# Patient Record
Sex: FEMALE | Race: WHITE | NOT HISPANIC OR LATINO | Employment: FULL TIME | URBAN - METROPOLITAN AREA
[De-identification: names, ages, dates, MRNs, and addresses within clinical notes are randomized per-mention and may not be internally consistent; named-entity substitution may affect disease eponyms.]

---

## 2017-06-13 ENCOUNTER — OFFICE VISIT (OUTPATIENT)
Dept: URGENT CARE | Facility: URGENT CARE | Age: 52
End: 2017-06-13
Payer: COMMERCIAL

## 2017-06-13 VITALS
SYSTOLIC BLOOD PRESSURE: 131 MMHG | DIASTOLIC BLOOD PRESSURE: 84 MMHG | WEIGHT: 162 LBS | TEMPERATURE: 98.4 F | HEART RATE: 78 BPM

## 2017-06-13 DIAGNOSIS — S80.812A ABRASION OF ANTERIOR LOWER LEG, LEFT, INITIAL ENCOUNTER: Primary | ICD-10-CM

## 2017-06-13 DIAGNOSIS — Z00.00 PREVENTATIVE HEALTH CARE: ICD-10-CM

## 2017-06-13 PROCEDURE — 90715 TDAP VACCINE 7 YRS/> IM: CPT | Performed by: INTERNAL MEDICINE

## 2017-06-13 PROCEDURE — 90471 IMMUNIZATION ADMIN: CPT | Performed by: INTERNAL MEDICINE

## 2017-06-13 PROCEDURE — 99203 OFFICE O/P NEW LOW 30 MIN: CPT | Mod: 25 | Performed by: INTERNAL MEDICINE

## 2017-06-13 RX ORDER — FLUTICASONE PROPIONATE AND SALMETEROL XINAFOATE 45; 21 UG/1; UG/1
AEROSOL, METERED RESPIRATORY (INHALATION)
COMMUNITY
Start: 2017-05-26

## 2017-06-13 NOTE — MR AVS SNAPSHOT
"              After Visit Summary   2017    Sarai Phelps    MRN: [id number]           Patient Information     Date Of Birth          1965        Visit Information        Provider Department      2017 7:20 PM Divina Bryant MD [facility]        Today's Diagnoses     Abrasion of anterior lower leg, left, initial encounter    -  1    Preventative health care           Follow-ups after your visit        Follow-up notes from your care team     Return if symptoms worsen or fail to improve.      Who to contact     If you have questions or need follow up information about today's clinic visit or your schedule please contact [facility] directly at [phone].  Normal or non-critical lab and imaging results will be communicated to you by MyChart, letter or phone within 4 business days after the clinic has received the results. If you do not hear from us within 7 days, please contact the clinic through MyChart or phone. If you have a critical or abnormal lab result, we will notify you by phone as soon as possible.  Submit refill requests through [employer] or call your pharmacy and they will forward the refill request to us. Please allow 3 business days for your refill to be completed.          Additional Information About Your Visit        MyChart Information     [employer] lets you send messages to your doctor, view your test results, renew your prescriptions, schedule appointments and more. To sign up, go to [email or web address] . Click on \"Log in\" on the left side of the screen, which will take you to the Welcome page. Then click on \"Sign up Now\" on the right side of the page.     You will be asked to enter the access code listed below, as well as some personal information. Please follow the directions to create your username and password.     Your access code is: [email or web address]  Expires: 2017  9:00 PM     Your access code will  in 90 days. If you need help or a " new code, please call your [place] clinic or [phone].        Care EveryWhere ID     This is your Care EveryWhere ID. This could be used by other organizations to access your [place] medical records  [id number]        Your Vitals Were     Pulse Temperature                78 98.4  F (36.9  C) (Oral)           Blood Pressure from Last 3 Encounters:   06/13/17 131/84    Weight from Last 3 Encounters:   06/13/17 162 lb (73.5 kg)              We Performed the Following     ADMIN 1st VACCINE     SCREENING QUESTIONS FOR ADULT IMMUNIZATIONS     TDAP VACCINE (ADACEL)        Primary Care Provider    None Specified       No primary provider on file.        Thank you!     [facility] [place]  [facility]. Our goal is always to provide you with excellent care. Hearing back from our patients is one way we can continue to improve our services. Please take a few minutes to complete the written survey that you may receive in the mail after your visit with us. Thank you!             Your Updated Medication List - Protect others around you: Learn how to safely use, store and throw away your medicines at www.disposemymeds.org.          This list is accurate as of: 6/13/17  9:00 PM.  Always use your most recent med list.                   Brand Name Dispense Instructions for use    ADVAIR HFA 45-21 MCG/ACT inhaler   Generic drug:  fluticasone-salmeterol

## 2017-06-14 NOTE — NURSING NOTE
Screening Questionnaire for Adult Immunization    Are you sick today?   No   Do you have allergies to medications, food, a vaccine component or latex?   No   Have you ever had a serious reaction after receiving a vaccination?   No   Do you have a long-term health problem with heart disease, lung disease, asthma, kidney disease, metabolic disease (e.g. diabetes), anemia, or other blood disorder?   No   Do you have cancer, leukemia, HIV/AIDS, or any other immune system problem?   No   In the past 3 months, have you taken medications that affect  your immune system, such as prednisone, other steroids, or anticancer drugs; drugs for the treatment of rheumatoid arthritis, Crohn s disease, or psoriasis; or have you had radiation treatments?   No   Have you had a seizure, or a brain or other nervous system problem?   No   During the past year, have you received a transfusion of blood or blood     products, or been given immune (gamma) globulin or antiviral drug?   No   For women: Are you pregnant or is there a chance you could become        pregnant during the next month?   No   Have you received any vaccinations in the past 4 weeks?   No     Immunization questionnaire answers were all negative.      MNVFC doesn't apply on this patient    Per orders of Dr. Bryatn, injection of TDAP given by Heidy Alford. Patient instructed to remain in clinic for 20 minutes afterwards, and to report any adverse reaction to me immediately.       Screening performed by Heidy Alford on 6/13/2017 at 7:54 PM.

## 2017-06-14 NOTE — NURSING NOTE
Chief Complaint   Patient presents with     Fall     X 4 days ago, scraped left leg, now has swelling and bruising       Initial /84  Pulse 78  Temp 98.4  F (36.9  C) (Oral)  Wt 162 lb (73.5 kg) There is no height or weight on file to calculate BMI.  Medication Reconciliation: Complete   Heidy Alford CMA

## 2017-06-14 NOTE — PROGRESS NOTES
SUBJECTIVE:  Sarai Phelps is an 51 year old female who presents for scratch on leg.  Has covered it with a bandage and put abx ointment on it.  Fell on a rock four days ago and scraped her leg.  Today seems red around it, not sure how much more than it did before.  Noticed a little swelling of leg also.  No fevers, chills or sweats.  Hurts to touch around the area of redness.  Doesn't know when last tetanus shot was.  No recent travel.  No n/v.         has no past medical history on file.  ALLERGIES:  Review of patient's allergies indicates no known allergies.    Current Outpatient Prescriptions   Medication     ADVAIR HFA 45-21 MCG/ACT inhaler     No current facility-administered medications for this visit.          ROS:  ROS is done and is negative for general/constitutional, eye, ENT, Respiratory, cardiovascular, GI, , Skin, musculoskeletal except as noted elsewhere.       OBJECTIVE:  /84  Pulse 78  Temp 98.4  F (36.9  C) (Oral)  Wt 162 lb (73.5 kg)  GENERAL APPEARANCE: Alert, in no acute distress  EYES: normal  NOSE: normal  OROPHARYNX:normal  NECK:No adenopathy,masses or thyromegaly  RESP: normal and clear to auscultation  CV:regular rate and rhythm and no murmurs, clicks, or gallops  ABDOMEN: Abdomen soft, non-tender. BS normal. No masses, organomegaly  SKIN: Left anterior shin with abrasion approx 4x8 inches at largest area, but area affected is irregular.  There are two deeper scratches in the center of the affected area approx 2.5inches long one superior to the other.  The area around the deeper scratches has more shallow abrasion that appears to be healing well.  No increased warmth.  No erythema outside of the abrasion area.  Tender over the deeper scratches, but the more shallow abrasion areas are only mildly tender.  No drainage or fluctuance noted.  MUSCULOSKELETAL:Musculoskeletal normal      RECENT LAB RESULTS  No results found for this or any previous  visit..    ASSESSMENT/PLAN:    ASSESSMENT / PLAN:  (I24.236X) Abrasion of anterior lower leg, left, initial encounter  (primary encounter diagnosis)  Comment: feel on a rock a few days ago. Currently no evidence of infection and the areas of erythema are due to the abrasion and healing, and it appears to be healing as expected.  Plan: I reviewed supportive care including abx ointment, expected course, and signs of concern including signs of infection.  Follow up prn or if she does not improve or if worsens in any way.  Pt does not know when last tetanus booster was, so will give today    (Z00.00) Preventative health care  Comment:   Plan: TDAP VACCINE (ADACEL), ADMIN 1st VACCINE,         SCREENING QUESTIONS FOR ADULT IMMUNIZATIONS              See [facility] for orders, medications, letters, patient instructions    Divina Bryant M.D.

## 2018-01-18 ENCOUNTER — OFFICE VISIT (OUTPATIENT)
Dept: FAMILY MEDICINE | Facility: CLINIC | Age: 53
End: 2018-01-18
Payer: COMMERCIAL

## 2018-01-18 VITALS
DIASTOLIC BLOOD PRESSURE: 87 MMHG | HEART RATE: 83 BPM | TEMPERATURE: 97.3 F | WEIGHT: 161 LBS | BODY MASS INDEX: 26.82 KG/M2 | HEIGHT: 65 IN | SYSTOLIC BLOOD PRESSURE: 135 MMHG

## 2018-01-18 DIAGNOSIS — R30.0 DYSURIA: Primary | ICD-10-CM

## 2018-01-18 LAB
ALBUMIN UR-MCNC: NEGATIVE MG/DL
APPEARANCE UR: CLEAR
BILIRUB UR QL STRIP: NEGATIVE
COLOR UR AUTO: YELLOW
GLUCOSE UR STRIP-MCNC: NEGATIVE MG/DL
HGB UR QL STRIP: NEGATIVE
KETONES UR STRIP-MCNC: NEGATIVE MG/DL
LEUKOCYTE ESTERASE UR QL STRIP: NEGATIVE
NITRATE UR QL: NEGATIVE
PH UR STRIP: 6.5 PH (ref 5–7)
SOURCE: NORMAL
SP GR UR STRIP: 1.01 (ref 1–1.03)
UROBILINOGEN UR STRIP-ACNC: 0.2 EU/DL (ref 0.2–1)

## 2018-01-18 PROCEDURE — 81003 URINALYSIS AUTO W/O SCOPE: CPT | Performed by: FAMILY MEDICINE

## 2018-01-18 PROCEDURE — 99213 OFFICE O/P EST LOW 20 MIN: CPT | Performed by: FAMILY MEDICINE

## 2018-01-18 RX ORDER — FLUCONAZOLE 150 MG/1
150 TABLET ORAL ONCE
Qty: 1 TABLET | Refills: 0 | Status: SHIPPED | OUTPATIENT
Start: 2018-01-18 | End: 2018-01-18

## 2018-01-18 NOTE — MR AVS SNAPSHOT
"              After Visit Summary   2018    Sarai Phelps    MRN: [id number]           Patient Information     Date Of Birth          1965        Visit Information        Provider Department      2018 3:40 PM Radha Fernandez MD [facility]        Today's Diagnoses     Dysuria    -  1       Follow-ups after your visit        Who to contact     If you have questions or need follow up information about today's clinic visit or your schedule please contact [facility] directly at [phone].  Normal or non-critical lab and imaging results will be communicated to you by [employer], letter or phone within 4 business days after the clinic has received the results. If you do not hear from us within 7 days, please contact the clinic through [employer] or phone. If you have a critical or abnormal lab result, we will notify you by phone as soon as possible.  Submit refill requests through [employer] or call your pharmacy and they will forward the refill request to us. Please allow 3 business days for your refill to be completed.          Additional Information About Your Visit        MyChart Information     [employer] lets you send messages to your doctor, view your test results, renew your prescriptions, schedule appointments and more. To sign up, go to [email or web address] . Click on \"Log in\" on the left side of the screen, which will take you to the Welcome page. Then click on \"Sign up Now\" on the right side of the page.     You will be asked to enter the access code listed below, as well as some personal information. Please follow the directions to create your username and password.     Your access code is: [email or web address]  Expires: 2018  4:48 PM     Your access code will  in 90 days. If you need help or a new code, please call your [facility] or [phone].        Care EveryWhere ID     This is your Care EveryWhere ID. This could be used by other organizations to access " "your [place] medical records  [id number]        Your Vitals Were     Pulse Temperature Height BMI (Body Mass Index)          83 97.3  F (36.3  C) (Oral) 5' 5\" (1.651 m) 26.79 kg/m2         Blood Pressure from Last 3 Encounters:   01/18/18 135/87   06/13/17 131/84    Weight from Last 3 Encounters:   01/18/18 161 lb (73 kg)   06/13/17 162 lb (73.5 kg)              We Performed the Following     *UA reflex to Microscopic and Culture ([place] and [facility] (except [place] and [place])          Today's Medication Changes          These changes are accurate as of: 1/18/18 11:59 PM.  If you have any questions, ask your nurse or doctor.               Start taking these medicines.        Dose/Directions    fluconazole 150 MG tablet   Commonly known as:  DIFLUCAN   Used for:  Dysuria   Started by:  Radha Fernandez MD        Dose:  150 mg   Take 1 tablet (150 mg) by mouth once for 1 dose   Quantity:  1 tablet   Refills:  0            Where to get your medicines      These medications were sent to [place] [address] [place] [facility] - [address] [address]     Phone:  [phone]     fluconazole 150 MG tablet                Primary Care Provider Office Phone # Fax #    [facility] [phone] [address] [facility] [address]        Equal Access to Services     DANIEL YOUSIF AH: Hadii paresh ku hadasho Soomaali, waaxda luqadaha, qaybta kaalmada adeegyada, sherry payne . So [facility] [phone].    ATENCIÓN: Si habla español, tiene a trujillo disposición servicios gratuitos de asistencia lingüística. Llame al [phone].    We comply with applicable federal civil rights laws and [place] laws. We do not discriminate on the basis of race, color, national origin, age, disability, sex, sexual orientation, or gender identity.            Thank you!     Thank you for choosing [facility]  for your care. Our goal is " always to provide you with excellent care. Hearing back from our patients is one way we can continue to improve our services. Please take a few minutes to complete the written survey that you may receive in the mail after your visit with us. Thank you!             Your Updated Medication List - Protect others around you: Learn how to safely use, store and throw away your medicines at www.disposemymeds.org.          This list is accurate as of: 1/18/18 11:59 PM.  Always use your most recent med list.                   Brand Name Dispense Instructions for use Diagnosis    ADVAIR HFA 45-21 MCG/ACT inhaler   Generic drug:  fluticasone-salmeterol           fluconazole 150 MG tablet    DIFLUCAN    1 tablet    Take 1 tablet (150 mg) by mouth once for 1 dose    Dysuria

## 2018-01-18 NOTE — NURSING NOTE
"Chief Complaint   Patient presents with     UTI       Initial /87  Pulse 83  Temp 97.3  F (36.3  C) (Oral)  Ht 5' 5\" (1.651 m)  Wt 161 lb (73 kg)  BMI 26.79 kg/m2 Estimated body mass index is 26.79 kg/(m^2) as calculated from the following:    Height as of this encounter: 5' 5\" (1.651 m).    Weight as of this encounter: 161 lb (73 kg).  Medication Reconciliation: elizabeth Tavares MA      "

## 2018-01-18 NOTE — PROGRESS NOTES
"  SUBJECTIVE:   Sarai Phelps is a 52 year old female who presents to clinic today for the following health issues:        URINARY TRACT SYMPTOMS  Onset: 5 days     Description:   Painful urination (Dysuria): no   Blood in urine (Hematuria): no   Delay in urine (Hesitency): no     Intensity: moderate    Progression of Symptoms:  same    Accompanying Signs & Symptoms:  Fever/chills: no   Flank pain YES  Nausea and vomiting: no   Any vaginal symptoms: vaginal itching  Abdominal/Pelvic Pain: no     History:   History of frequent UTI's: no   History of kidney stones: no   Sexually Active: YES  Possibility of pregnancy: yes     Precipitating factors:   constipation    Therapies Tried and outcome: cranberry, water, fiber    No vaginal discharge.   Does have low back pain  No fever or nausea  Just got over period and was having some constipation  No belly pain    Does have some vaginal itching/  Declines testing for wet prep      Problem list and histories reviewed & adjusted, as indicated.  Additional history: as documented    Labs reviewed in EPIC    Reviewed and updated as needed this visit by clinical staffTobacco  Allergies  Meds  Med Hx  Surg Hx  Fam Hx  Soc Hx      Reviewed and updated as needed this visit by Provider         ROS:  Constitutional, HEENT, cardiovascular, pulmonary, gi and gu systems are negative, except as otherwise noted.      OBJECTIVE:   /87  Pulse 83  Temp 97.3  F (36.3  C) (Oral)  Ht 5' 5\" (1.651 m)  Wt 161 lb (73 kg)  BMI 26.79 kg/m2  Body mass index is 26.79 kg/(m^2).  GENERAL: healthy, alert and no distress    Diagnostic Test Results:  Urinalysis - UA RESULTS:  Recent Labs   Lab Test  01/18/18   1530   COLOR  Yellow   APPEARANCE  Clear   URINEGLC  Negative   URINEBILI  Negative   URINEKETONE  Negative   SG  1.010   UBLD  Negative   URINEPH  6.5   PROTEIN  Negative   UROBILINOGEN  0.2   NITRITE  Negative   LEUKEST  Negative         ASSESSMENT/PLAN:       1. Dysuria  UA " negative. Treat for yeast infection.  Fu if not improving  - *UA reflex to Microscopic and Culture ([place] [facility] (except [place] and [place])  - fluconazole (DIFLUCAN) 150 MG tablet; Take 1 tablet (150 mg) by mouth once for 1 dose  Dispense: 1 tablet; Refill: 0        Radha Gibbons MD  [facility]

## 2018-04-10 ENCOUNTER — TELEPHONE (OUTPATIENT)
Dept: FAMILY MEDICINE | Facility: CLINIC | Age: 53
End: 2018-04-10

## 2018-04-10 NOTE — LETTER
Sarai Phelps  [address]  [place] [address]      April 12, 2018          Gabby Phelps      Our records indicate that you have not scheduled for a(n)Mammogram which was recommended by your health care team. Monitoring and managing your preventative and chronic health conditions are very important to us.       If you have received your health care elsewhere, please provide us with that information so it can be documented in your chart.    Please call [phone] or message us through your [employer] account to schedule an appointment or provide information for your chart.     We look forward to seeing you and working with you on your health care needs.     Sincerely,   Radha Fernandez MD/jerrica          *If you have already scheduled an appointment, please disregard this reminder

## 2018-07-02 ENCOUNTER — OFFICE VISIT (OUTPATIENT)
Dept: FAMILY MEDICINE | Facility: CLINIC | Age: 53
End: 2018-07-02
Payer: COMMERCIAL

## 2018-07-02 VITALS
BODY MASS INDEX: 26.79 KG/M2 | WEIGHT: 161 LBS | DIASTOLIC BLOOD PRESSURE: 88 MMHG | HEART RATE: 79 BPM | OXYGEN SATURATION: 100 % | SYSTOLIC BLOOD PRESSURE: 143 MMHG | RESPIRATION RATE: 20 BRPM | TEMPERATURE: 97.5 F

## 2018-07-02 DIAGNOSIS — J45.30 MILD PERSISTENT ASTHMA WITHOUT COMPLICATION: ICD-10-CM

## 2018-07-02 DIAGNOSIS — J01.90 ACUTE NON-RECURRENT SINUSITIS, UNSPECIFIED LOCATION: Primary | ICD-10-CM

## 2018-07-02 PROCEDURE — 99213 OFFICE O/P EST LOW 20 MIN: CPT | Performed by: FAMILY MEDICINE

## 2018-07-02 RX ORDER — AMOXICILLIN 875 MG
875 TABLET ORAL 2 TIMES DAILY
Qty: 20 TABLET | Refills: 0 | Status: SHIPPED | OUTPATIENT
Start: 2018-07-02 | End: 2018-10-01

## 2018-07-02 NOTE — PROGRESS NOTES
Has had sinus congestion for about a week now, has been taking clairiton D twice daily with little relief.       HISTORY    As above.  She has persistent sinus congestion and postnasal drainage.  Mild sore throat.  No ear pain.    Patient is status post tonsillectomy.  She also has a history of persistent asthma and uses Advair.  This has not been acting up.    Patient Active Problem List   Diagnosis     Mild persistent asthma without complication       REVIEW OF SYSTEMS    No fever.  No wheeze or S OB.  No nausea.      No past medical history on file.      EXAM  /88  Pulse 79  Temp 97.5  F (36.4  C) (Oral)  Resp 20  Wt 161 lb (73 kg)  SpO2 100%  BMI 26.79 kg/m2    Tympanic membranes normal.  Pharynx without redness.  Absent tonsils.  Moderately enlarged anterior cervical nodes.  Chest clear.      (J01.90) Acute non-recurrent sinusitis, unspecified location  (primary encounter diagnosis)  Comment:   Plan: amoxicillin (AMOXIL) 875 MG tablet            (J45.30) Mild persistent asthma without complication  Comment:   Plan:

## 2018-07-02 NOTE — MR AVS SNAPSHOT
After Visit Summary   7/2/2018    Sarai Phelps    MRN: [id number]           Patient Information     Date Of Birth          1965        Visit Information        Provider Department      7/2/2018 10:40 AM Nate Nazario MD [facility]        Today's Diagnoses     Acute non-recurrent sinusitis, unspecified location    -  1    Mild persistent asthma without complication          Care Instructions    Take prescribed medication as directed.    Continue Claritin D and Advair          Follow-ups after your visit        Who to contact     If you have questions or need follow up information about today's clinic visit or your schedule please contact [facility] directly at [phone].  Normal or non-critical lab and imaging results will be communicated to you by MyChart, letter or phone within 4 business days after the clinic has received the results. If you do not hear from us within 7 days, please contact the clinic through MyChart or phone. If you have a critical or abnormal lab result, we will notify you by phone as soon as possible.  Submit refill requests through [employer] or call your pharmacy and they will forward the refill request to us. Please allow 3 business days for your refill to be completed.          Additional Information About Your Visit        Care EveryWhere ID     This is your Care EveryWhere ID. This could be used by other organizations to access your [place] medical records  [id number]        Your Vitals Were     Pulse Temperature Respirations Pulse Oximetry BMI (Body Mass Index)       79 97.5  F (36.4  C) (Oral) 20 100% 26.79 kg/m2        Blood Pressure from Last 3 Encounters:   07/02/18 143/88   01/18/18 135/87   06/13/17 131/84    Weight from Last 3 Encounters:   07/02/18 161 lb (73 kg)   01/18/18 161 lb (73 kg)   06/13/17 162 lb (73.5 kg)              Today, you had the following     No orders found for display         Today's Medication  Changes          These changes are accurate as of 7/2/18 10:57 AM.  If you have any questions, ask your nurse or doctor.               Start taking these medicines.        Dose/Directions    amoxicillin 875 MG tablet   Commonly known as:  AMOXIL   Used for:  Acute non-recurrent sinusitis, unspecified location   Started by:  Nate Nazario MD        Dose:  875 mg   Take 1 tablet (875 mg) by mouth 2 times daily   Quantity:  20 tablet   Refills:  0            Where to get your medicines      These medications were sent to [facility] [id number] [place] [address], [facility] [address]  [address] [place] [address], [facility] [address]     Phone:  [phone]     amoxicillin 875 MG tablet                Primary Care Provider Office Phone # Fax #    [facility] [phone] [address] [facility] [address]        Equal Access to Services     [facility] Foundation HospitalKIKE : Hadii paresh baker hadasho Soomaali, waaxda luqadaha, qaybta kaalmada adeegyada, sherry munsonin hayaamir payne . So [facility] [phone].    ATENCIÓN: Si habla español, tiene a trujillo disposición servicios gratuitos de asistencia lingüística. Rominaame al [phone].    We comply with applicable federal civil rights laws and [place] laws. We do not discriminate on the basis of race, color, national origin, age, disability, sex, sexual orientation, or gender identity.            Thank you!     Thank you for choosing [facility]  for your care. Our goal is always to provide you with excellent care. Hearing back from our patients is one way we can continue to improve our services. Please take a few minutes to complete the written survey that you may receive in the mail after your visit with us. Thank you!             Your Updated Medication List - Protect others around you: Learn how to safely use, store and throw away your medicines at www.disposemymeds.org.          This list is accurate as of 7/2/18 10:57 AM.   Always use your most recent med list.                   Brand Name Dispense Instructions for use Diagnosis    ADVAIR HFA 45-21 MCG/ACT inhaler   Generic drug:  fluticasone-salmeterol           amoxicillin 875 MG tablet    AMOXIL    20 tablet    Take 1 tablet (875 mg) by mouth 2 times daily    Acute non-recurrent sinusitis, unspecified location

## 2018-08-28 ENCOUNTER — TELEPHONE (OUTPATIENT)
Dept: FAMILY MEDICINE | Facility: CLINIC | Age: 53
End: 2018-08-28

## 2018-08-28 NOTE — TELEPHONE ENCOUNTER
8/28/2018    Attempt 1    Contacted patient in regards to scheduling VIP mammogram  Message on voicemail     Patient is also due for - Preventive Health Screening Colonoscopy and Cervical/PAP    Comments:       Outreach   Claudia Zapata

## 2018-09-18 NOTE — TELEPHONE ENCOUNTER
9/18/2018    Call Regarding VIP Mammogram    Attempt 2    Message on voicemail    Patient is also due for: Preventive Health Screening Colonoscopy and Cervical/PAP    Outreach   SV

## 2018-09-25 NOTE — TELEPHONE ENCOUNTER
The Patient declined Preventive Health Screens for: mammogram   Please review chart and follow-up with patient if needed.    Thank you

## 2018-10-01 ENCOUNTER — RADIANT APPOINTMENT (OUTPATIENT)
Dept: GENERAL RADIOLOGY | Facility: CLINIC | Age: 53
End: 2018-10-01
Attending: PHYSICIAN ASSISTANT
Payer: COMMERCIAL

## 2018-10-01 ENCOUNTER — OFFICE VISIT (OUTPATIENT)
Dept: FAMILY MEDICINE | Facility: CLINIC | Age: 53
End: 2018-10-01
Payer: COMMERCIAL

## 2018-10-01 VITALS
BODY MASS INDEX: 26.49 KG/M2 | DIASTOLIC BLOOD PRESSURE: 86 MMHG | HEART RATE: 78 BPM | TEMPERATURE: 97.9 F | HEIGHT: 65 IN | RESPIRATION RATE: 18 BRPM | OXYGEN SATURATION: 100 % | SYSTOLIC BLOOD PRESSURE: 135 MMHG | WEIGHT: 159 LBS

## 2018-10-01 DIAGNOSIS — Z12.31 VISIT FOR SCREENING MAMMOGRAM: ICD-10-CM

## 2018-10-01 DIAGNOSIS — Z12.11 SCREEN FOR COLON CANCER: ICD-10-CM

## 2018-10-01 DIAGNOSIS — M25.571 PAIN IN JOINT INVOLVING ANKLE AND FOOT, RIGHT: ICD-10-CM

## 2018-10-01 DIAGNOSIS — Z12.4 SCREENING FOR MALIGNANT NEOPLASM OF CERVIX: ICD-10-CM

## 2018-10-01 DIAGNOSIS — M25.571 PAIN IN JOINT INVOLVING ANKLE AND FOOT, RIGHT: Primary | ICD-10-CM

## 2018-10-01 DIAGNOSIS — Z23 NEED FOR PROPHYLACTIC VACCINATION AND INOCULATION AGAINST INFLUENZA: ICD-10-CM

## 2018-10-01 PROCEDURE — 73610 X-RAY EXAM OF ANKLE: CPT | Mod: RT

## 2018-10-01 PROCEDURE — 99213 OFFICE O/P EST LOW 20 MIN: CPT | Performed by: PHYSICIAN ASSISTANT

## 2018-10-01 RX ORDER — LORATADINE/PSEUDOEPHEDRINE 10MG-240MG
TABLET, EXTENDED RELEASE 24 HR ORAL
COMMUNITY
Start: 2018-02-08

## 2018-10-01 NOTE — MR AVS SNAPSHOT
After Visit Summary   10/1/2018    Sarai Phelps    MRN: [id number]           Patient Information     Date Of Birth          1965        Visit Information        Provider Department      10/1/2018 9:40 AM Divina Jay PA-C [facility] [place]        Today's Diagnoses     Pain in joint involving ankle and foot, right    -  1    Screen for colon cancer        Visit for screening mammogram        Screening for malignant neoplasm of cervix        Need for prophylactic vaccination and inoculation against influenza          Care Instructions    Schedule colonoscopy and mammogram please as these are important cancer prevention screenings          Follow-ups after your visit        Additional Services     GASTROENTEROLOGY ADULT REF PROCEDURE ONLY [facility] [phone]; [facility]       Last Lab Result: No results found for: CR  There is no height or weight on file to calculate BMI.     Needed:  No  Language:  English    Patient will be contacted to schedule procedure.     Please be aware that coverage of these services is subject to the terms and limitations of your health insurance plan.  Call member services at your health plan with any benefit or coverage questions.  Any procedures must be performed at a [place] facility OR coordinated by your clinic's referral office.    Please bring the following with you to your appointment:    (1) Any X-Rays, CTs or MRIs which have been performed.  Contact the facility where they were done to arrange for  prior to your scheduled appointment.    (2) List of current medications   (3) This referral request   (4) Any documents/labs given to you for this referral            PODIATRY/FOOT & ANKLE SURGERY REFERRAL       Your provider has referred you to: [facility]: [facility] [place] [phone]   [email or web address]    Please be aware that coverage of these services is  subject to the terms and limitations of your health insurance plan.  Call member services at your health plan with any benefit or coverage questions.      Please bring the following to your appointment:  >>   Any x-rays, CTs or MRIs which have been performed.  Contact the facility where they were done to arrange for  prior to your scheduled appointment.    >>   List of current medications   >>   This referral request   >>   Any documents/labs given to you for this referral                  Your next 10 appointments already scheduled     Nov 05, 2018  8:30 AM CST   PHYSICAL with MARTINE Stevenson CNP   [facility] ([facility])    [address] [address] [phone]              Future tests that were ordered for you today     Open Future Orders        Priority Expected Expires Ordered    XR Ankle Right G/E 3 Views Routine 10/1/2018 10/1/2019 10/1/2018    MA SCREENING DIGITAL BILAT - Future  (s+30) Routine  10/1/2019 10/1/2018            Who to contact     If you have questions or need follow up information about today's clinic visit or your schedule please contact [facility] directly at [phone].  Normal or non-critical lab and imaging results will be communicated to you by MyChart, letter or phone within 4 business days after the clinic has received the results. If you do not hear from us within 7 days, please contact the clinic through MyChart or phone. If you have a critical or abnormal lab result, we will notify you by phone as soon as possible.  Submit refill requests through [employer] or call your pharmacy and they will forward the refill request to us. Please allow 3 business days for your refill to be completed.          Additional Information About Your Visit        Care EveryWhere ID     This is your Care EveryWhere ID. This could be used by other organizations to access your [place] medical records  [id number]        Your  "Vitals Were     Pulse Temperature Respirations Height Pulse Oximetry Breastfeeding?    78 97.9  F (36.6  C) (Oral) 18 5' 5\" (1.651 m) 100% No    BMI (Body Mass Index)                   26.46 kg/m2            Blood Pressure from Last 3 Encounters:   10/01/18 135/86   07/02/18 143/88   01/18/18 135/87    Weight from Last 3 Encounters:   10/01/18 159 lb (72.1 kg)   07/02/18 161 lb (73 kg)   01/18/18 161 lb (73 kg)              We Performed the Following     GASTROENTEROLOGY ADULT REF PROCEDURE ONLY [facility] [phone]; [facility]     PODIATRY/FOOT & ANKLE SURGERY REFERRAL        Primary Care Provider Office Phone # Fax #    [facility] [phone] [address] [facility] [address]        Equal Access to Services     [facility] CenterKIKE : Hadii aad ku hadasho Soomaali, waaxda luqadaha, qaybta kaalmada adeegyada, waxay idiin hayaan adeedward galoaraalda payne . So [facility] [phone].    ATENCIÓN: Si habla español, tiene a trujillo disposición servicios gratuitos de asistencia lingüística. Ayana al [phone].    We comply with applicable federal civil rights laws and [place] laws. We do not discriminate on the basis of race, color, national origin, age, disability, sex, sexual orientation, or gender identity.            Thank you!     Thank you for choosing [facility]  for your care. Our goal is always to provide you with excellent care. Hearing back from our patients is one way we can continue to improve our services. Please take a few minutes to complete the written survey that you may receive in the mail after your visit with us. Thank you!             Your Updated Medication List - Protect others around you: Learn how to safely use, store and throw away your medicines at www.disposemymeds.org.          This list is accurate as of 10/1/18 10:11 AM.  Always use your most recent med list.                   Brand Name Dispense Instructions for use Diagnosis    " ADVAIR HFA 45-21 MCG/ACT inhaler   Generic drug:  fluticasone-salmeterol           LORATADINE-D 24HR  MG per 24 hr tablet   Generic drug:  loratadine-pseudoePHEDrine           VITAMIN D (CHOLECALCIFEROL) PO      Take 3,000 Units by mouth daily

## 2018-10-01 NOTE — PROGRESS NOTES
"  SUBJECTIVE:                                                    Sarai Phelps is a 53 year old female who presents to clinic today for the following health issues:    Joint Pain    Onset: x 3-4 months    Description:   Location: R ankle  Character: Sharp, Dull ache, Stabbing and Burning    Intensity: moderate    Progression of Symptoms: worse    Accompanying Signs & Symptoms:  Other symptoms: none    History:   Previous similar pain: YES      Precipitating factors:   Trauma or overuse: YES- possible unsure per pt    Alleviating factors:  Improved by: rest/inactivity and ibuprofen    Therapies Tried and outcome: Noted above and SX no improved      Tried a brace previously, hasn't been able to find one that worked.     Had a \"bad sprain\" 30 years ago and has had trouble with it since. No surgery on this.  Has been working out more and also had plantar fascitis.  Has more pain with walking. Changed her shoes, wearing better shoes now. No edema. No weakness.   No recent imaging of her ankle.  Xray years ago only. Never had MRI.   No numbness or tingling on that side. Most pain is lateral to her ankle.     PAP is due per pt, will help pt elena appt.    MAMMO is due per pt    Colon cancer screening is due.    Ibuprofen does help.       Problem list and histories reviewed & adjusted, as indicated.  Additional history: as documented    Patient Active Problem List   Diagnosis     Mild persistent asthma without complication     Past Surgical History:   Procedure Laterality Date     NO HISTORY OF SURGERY         Social History   Substance Use Topics     Smoking status: Never Smoker     Smokeless tobacco: Never Used     Alcohol use Yes      Comment: OCC     History reviewed. No pertinent family history.      Current Outpatient Prescriptions   Medication Sig Dispense Refill     ADVAIR HFA 45-21 MCG/ACT inhaler        LORATADINE-D 24HR  MG per 24 hr tablet        VITAMIN D, CHOLECALCIFEROL, PO Take 3,000 Units by mouth " "daily       Allergies   Allergen Reactions     Sulfa Drugs Rash       ROS:  Constitutional, HEENT, cardiovascular, pulmonary, GI, , musculoskeletal, neuro, skin, endocrine and psych systems are negative, except as otherwise noted.    OBJECTIVE:     /86  Pulse 78  Temp 97.9  F (36.6  C) (Oral)  Resp 18  Ht 5' 5\" (1.651 m)  Wt 159 lb (72.1 kg)  SpO2 100%  Breastfeeding? No  BMI 26.46 kg/m2  Body mass index is 26.46 kg/(m^2).  GENERAL: healthy, alert and no distress  RESP: lungs clear to auscultation - no rales, rhonchi or wheezes  CV: regular rate and rhythm, normal S1 S2, no S3 or S4, no murmur, click or rub, no peripheral edema and peripheral pulses strong  Skin: no rashes  Ortho: r ankle-No gross deformity.  No erythema.  No edema.  No ecchymosis. No warmth.  Tender to palpation posterior to right lateral malleolus.   Range of motion intact fully.  Sensation intact distally.  Distal pulses strong.  Knee and ankle strength 5/5 and equal bilaterally.  Anterior drawer sign negative.      Xray-pending    ASSESSMENT/PLAN:     ASSESSMENT / PLAN:  (M25.643) Pain in joint involving ankle and foot, right  (primary encounter diagnosis)  Comment:   Plan: XR Ankle Right G/E 3 Views, PODIATRY/FOOT &         ANKLE SURGERY REFERRAL          Continue nsaids prn  Wear ankle support given (lace up ) to try  Schedule with specialis    (Z12.11) Screen for colon cancer  Comment:   Plan: GASTROENTEROLOGY ADULT REF PROCEDURE ONLY [facility] [phone]; [facility]            (Z12.31) Visit for screening mammogram  Comment:   Plan: MA SCREENING DIGITAL BILAT - Future  (s+30)            (Z12.4) Screening for malignant neoplasm of cervix  Comment:   Plan:     (Z23) Need for prophylactic vaccination and inoculation against influenza  Comment:   Plan:    Patient Instructions   Schedule colonoscopy and mammogram please as these are important cancer prevention screenings      Divina Duggan " VIKKI Jay  [facility]

## 2018-10-01 NOTE — NURSING NOTE
"Chief Complaint   Patient presents with     Musculoskeletal Problem     R Ankle pain per pt x 3-4 months now no known injury      Health Maintenance     orders pended       Initial /86  Pulse 78  Temp 97.9  F (36.6  C) (Oral)  Resp 18  Ht 5' 5\" (1.651 m)  Wt 159 lb (72.1 kg)  SpO2 100%  Breastfeeding? No  BMI 26.46 kg/m2 Estimated body mass index is 26.46 kg/(m^2) as calculated from the following:    Height as of this encounter: 5' 5\" (1.651 m).    Weight as of this encounter: 159 lb (72.1 kg).  Medication Reconciliation: complete      Mame Vilchis MA    "

## 2018-10-01 NOTE — LETTER
My Asthma Action Plan  Name: Sarai Phleps   YOB: 1965  Date: 10/1/2018   My doctor: Divina Jay PA-C   My clinic: [facility]        My Control Medicine: { :838230}  My Rescue Medicine: { :299589}  {AAP include Oral Steroid:166196} My Asthma Severity: { :951655}  Avoid your asthma triggers: { :685044}        {Is patient a child or adult?:022781}       GREEN ZONE   Good Control    I feel good    No cough or wheeze    Can work, sleep and play without asthma symptoms       Take your asthma control medicine every day.     1. If exercise triggers your asthma, take your rescue medication    15 minutes before exercise or sports, and    During exercise if you have asthma symptoms  2. Spacer to use with inhaler: If you have a spacer, make sure to use it with your inhaler             YELLOW ZONE Getting Worse  I have ANY of these:    I do not feel good    Cough or wheeze    Chest feels tight    Wake up at night   1. Keep taking your Green Zone medications  2. Start taking your rescue medicine:    every 20 minutes for up to 1 hour. Then every 4 hours for 24-48 hours.  3. If you stay in the Yellow Zone for more than 12-24 hours, contact your doctor.  4. If you do not return to the Green Zone in 12-24 hours or you get worse, start taking your oral steroid medicine if prescribed by your provider.           RED ZONE Medical Alert - Get Help  I have ANY of these:    I feel awful    Medicine is not helping    Breathing getting harder    Trouble walking or talking    Nose opens wide to breathe       1. Take your rescue medicine NOW  2. If your provider has prescribed an oral steroid medicine, start taking it NOW  3. Call your doctor NOW  4. If you are still in the Red Zone after 20 minutes and you have not reached your doctor:    Take your rescue medicine again and    Call 911 or go to the emergency room right away    See your regular doctor within 2 weeks of an Emergency Room or Urgent  Care visit for follow-up treatment.          Annual Reminders:  Meet with Asthma Educator,  Flu Shot in the Fall, consider Pneumonia Vaccination for patients with asthma (aged 19 and older).    Pharmacy:    [facility] [place] [place] [facility] [address] [facility] [address] [place] [facility] [address] [place] [address]                      Asthma Triggers  How To Control Things That Make Your Asthma Worse    Triggers are things that make your asthma worse.  Look at the list below to help you find your triggers and what you can do about them.  You can help prevent asthma flare-ups by staying away from your triggers.      Trigger                                                          What you can do   Cigarette Smoke  Tobacco smoke can make asthma worse. Do not allow smoking in your home, car or around you.  Be sure no one smokes at a child s day care or school.  If you smoke, ask your health care provider for ways to help you quit.  Ask family members to quit too.  Ask your health care provider for a referral to Quit Plan to help you quit smoking, or call 5-079-939-PLAN.     Colds, Flu, Bronchitis  These are common triggers of asthma. Wash your hands often.  Don t touch your eyes, nose or mouth.  Get a flu shot every year.     Dust Mites  These are tiny bugs that live in cloth or carpet. They are too small to see. Wash sheets and blankets in hot water every week.   Encase pillows and mattress in dust mite proof covers.  Avoid having carpet if you can. If you have carpet, vacuum weekly.   Use a dust mask and HEPA vacuum.   Pollen and Outdoor Mold  Some people are allergic to trees, grass, or weed pollen, or molds. Try to keep your windows closed.  Limit time out doors when pollen count is high.   Ask you health care provider about taking medicine during allergy season.     Animal Dander  Some people are allergic to skin flakes, urine or saliva from pets with fur or feathers. Keep pets with fur or feathers out  of your home.    If you can t keep the pet outdoors, then keep the pet out of your bedroom.  Keep the bedroom door closed.  Keep pets off cloth furniture and away from stuffed toys.     Mice, Rats, and Cockroaches  Some people are allergic to the waste from these pests.   Cover food and garbage.  Clean up spills and food crumbs.  Store grease in the refrigerator.   Keep food out of the bedroom.   Indoor Mold  This can be a trigger if your home has high moisture. Fix leaking faucets, pipes, or other sources of water.   Clean moldy surfaces.  Dehumidify basement if it is damp and smelly.   Smoke, Strong Odors, and Sprays  These can reduce air quality. Stay away from strong odors and sprays, such as perfume, powder, hair spray, paints, smoke incense, paint, cleaning products, candles and new carpet.   Exercise or Sports  Some people with asthma have this trigger. Be active!  Ask your doctor about taking medicine before sports or exercise to prevent symptoms.    Warm up for 5-10 minutes before and after sports or exercise.     Other Triggers of Asthma  Cold air:  Cover your nose and mouth with a scarf.  Sometimes laughing or crying can be a trigger.  Some medicines and food can trigger asthma.

## 2018-10-02 ASSESSMENT — ASTHMA QUESTIONNAIRES: ACT_TOTALSCORE: 25

## 2018-10-11 ENCOUNTER — TELEPHONE (OUTPATIENT)
Dept: FAMILY MEDICINE | Facility: CLINIC | Age: 53
End: 2018-10-11

## 2018-10-12 NOTE — TELEPHONE ENCOUNTER
Did not place call. Call was made 10/10/18 from imaging scheduler noted in order, within 90 days. Comm pref is mail only. NX

## 2018-11-05 ENCOUNTER — RESULT FOLLOW UP (OUTPATIENT)
Dept: OBGYN | Facility: CLINIC | Age: 53
End: 2018-11-05

## 2018-11-05 ENCOUNTER — OFFICE VISIT (OUTPATIENT)
Dept: OBGYN | Facility: CLINIC | Age: 53
End: 2018-11-05
Payer: COMMERCIAL

## 2018-11-05 VITALS
TEMPERATURE: 97.1 F | HEART RATE: 69 BPM | BODY MASS INDEX: 26.62 KG/M2 | OXYGEN SATURATION: 100 % | WEIGHT: 159.8 LBS | SYSTOLIC BLOOD PRESSURE: 129 MMHG | HEIGHT: 65 IN | DIASTOLIC BLOOD PRESSURE: 80 MMHG

## 2018-11-05 DIAGNOSIS — Z11.59 NEED FOR HEPATITIS C SCREENING TEST: ICD-10-CM

## 2018-11-05 DIAGNOSIS — Z01.419 ENCOUNTER FOR GYNECOLOGICAL EXAMINATION WITHOUT ABNORMAL FINDING: Primary | ICD-10-CM

## 2018-11-05 DIAGNOSIS — E55.9 VITAMIN D DEFICIENCY: ICD-10-CM

## 2018-11-05 DIAGNOSIS — R87.810 CERVICAL HIGH RISK HPV (HUMAN PAPILLOMAVIRUS) TEST POSITIVE: ICD-10-CM

## 2018-11-05 DIAGNOSIS — Z13.6 CARDIOVASCULAR SCREENING; LDL GOAL LESS THAN 130: ICD-10-CM

## 2018-11-05 DIAGNOSIS — Z13.1 SCREENING FOR DIABETES MELLITUS: ICD-10-CM

## 2018-11-05 LAB
CHOLEST SERPL-MCNC: 166 MG/DL
DEPRECATED CALCIDIOL+CALCIFEROL SERPL-MC: 58 UG/L (ref 20–75)
GLUCOSE SERPL-MCNC: 85 MG/DL (ref 70–99)
HCV AB SERPL QL IA: NONREACTIVE
HDLC SERPL-MCNC: 84 MG/DL
LDLC SERPL CALC-MCNC: 68 MG/DL
NONHDLC SERPL-MCNC: 82 MG/DL
TRIGL SERPL-MCNC: 68 MG/DL

## 2018-11-05 PROCEDURE — 82947 ASSAY GLUCOSE BLOOD QUANT: CPT | Performed by: NURSE PRACTITIONER

## 2018-11-05 PROCEDURE — 82306 VITAMIN D 25 HYDROXY: CPT | Performed by: NURSE PRACTITIONER

## 2018-11-05 PROCEDURE — 99386 PREV VISIT NEW AGE 40-64: CPT | Performed by: NURSE PRACTITIONER

## 2018-11-05 PROCEDURE — 86803 HEPATITIS C AB TEST: CPT | Performed by: NURSE PRACTITIONER

## 2018-11-05 PROCEDURE — 87624 HPV HI-RISK TYP POOLED RSLT: CPT | Performed by: NURSE PRACTITIONER

## 2018-11-05 PROCEDURE — G0145 SCR C/V CYTO,THINLAYER,RESCR: HCPCS | Performed by: NURSE PRACTITIONER

## 2018-11-05 PROCEDURE — 36415 COLL VENOUS BLD VENIPUNCTURE: CPT | Performed by: NURSE PRACTITIONER

## 2018-11-05 PROCEDURE — 80061 LIPID PANEL: CPT | Performed by: NURSE PRACTITIONER

## 2018-11-05 RX ORDER — FLUTICASONE PROPIONATE 50 MCG
1 SPRAY, SUSPENSION (ML) NASAL DAILY
COMMUNITY
End: 2020-02-21

## 2018-11-05 ASSESSMENT — PAIN SCALES - GENERAL: PAINLEVEL: NO PAIN (0)

## 2018-11-05 NOTE — PROGRESS NOTES
SUBJECTIVE:   CC: Sarai Phelps is an 53 year old woman who presents for preventive health visit.     Physical   Annual:     Getting at least 3 servings of Calcium per day:  Yes    Bi-annual eye exam:  Yes    Dental care twice a year:  NO    Sleep apnea or symptoms of sleep apnea:  None    Diet:  Regular (no restrictions)    Frequency of exercise:  6-7 days/week    Duration of exercise:  15-30 minutes    Taking medications regularly:  Yes    Medication side effects:  Not applicable    Additional concerns today:  No    Mammogram already ordered by primary care. Declines colon cancer screening at this time. History of Vitamin D deficiency, would like testing today.    Today's PHQ-2 Score:   PHQ-2 ( 1999 Pf[employer]) 11/5/2018   Q1: Little interest or pleasure in doing things 0   Q2: Feeling down, depressed or hopeless 0   PHQ-2 Score 0   Q1: Little interest or pleasure in doing things Not at all   Q2: Feeling down, depressed or hopeless Not at all   PHQ-2 Score 0       Abuse: Current or Past(Physical, Sexual or Emotional)- No  Do you feel safe in your environment - Yes    Social History   Substance Use Topics     Smoking status: Never Smoker     Smokeless tobacco: Never Used     Alcohol use Yes      Comment: OCC     Alcohol Use 11/5/2018   If you drink alcohol do you typically have greater than 3 drinks per day OR greater than 7 drinks per week? No   No flowsheet data found.    Reviewed orders with patient.  Reviewed health maintenance and updated orders accordingly - Yes  Patient Active Problem List   Diagnosis     Mild persistent asthma without complication     Vitamin D deficiency     Past Surgical History:   Procedure Laterality Date     NO HISTORY OF SURGERY         Social History   Substance Use Topics     Smoking status: Never Smoker     Smokeless tobacco: Never Used     Alcohol use Yes      Comment: OCC     History reviewed. No pertinent family history.        Patient over age 50, mutual decision to screen  "reflected in health maintenance.    Pertinent mammograms are reviewed under the imaging tab.  History of abnormal Pap smear: NO - age 30-65 PAP every 5 years with negative HPV co-testing recommended     Reviewed and updated as needed this visit by clinical staff  Tobacco  Allergies  Meds  Med Hx  Surg Hx  Fam Hx  Soc Hx        Reviewed and updated as needed this visit by Provider        Past Medical History:   Diagnosis Date     NO ACTIVE PROBLEMS       Past Surgical History:   Procedure Laterality Date     NO HISTORY OF SURGERY         Review of Systems  CONSTITUTIONAL: NEGATIVE for fever, chills, change in weight  INTEGUMENTARU/SKIN: NEGATIVE for worrisome rashes, moles or lesions  EYES: NEGATIVE for vision changes or irritation  ENT: NEGATIVE for ear, mouth and throat problems  RESP: NEGATIVE for significant cough or SOB  BREAST: NEGATIVE for masses, tenderness or discharge  CV: NEGATIVE for chest pain, palpitations or peripheral edema  GI: NEGATIVE for nausea, abdominal pain, heartburn, or change in bowel habits  : NEGATIVE for unusual urinary or vaginal symptoms. Periods are starting to become irregular.  MUSCULOSKELETAL: NEGATIVE for significant arthralgias or myalgia  NEURO: NEGATIVE for weakness, dizziness or paresthesias  PSYCHIATRIC: NEGATIVE for changes in mood or affect     OBJECTIVE:   /80  Pulse 69  Temp 97.1  F (36.2  C) (Oral)  Ht 5' 5\" (1.651 m)  Wt 159 lb 12.8 oz (72.5 kg)  LMP 10/12/2018 (Approximate)  SpO2 100%  BMI 26.59 kg/m2  Physical Exam  GENERAL: healthy, alert and no distress  EYES: Eyes grossly normal to inspection, PERRL and conjunctivae and sclerae normal  HENT: ear canals and TM's normal, nose and mouth without ulcers or lesions  NECK: no adenopathy, no asymmetry, masses, or scars and thyroid normal to palpation  RESP: lungs clear to auscultation - no rales, rhonchi or wheezes  BREAST: normal without masses, tenderness or nipple discharge and no palpable axillary " "masses or adenopathy  CV: regular rate and rhythm, normal S1 S2, no S3 or S4, no murmur, click or rub, no peripheral edema and peripheral pulses strong  ABDOMEN: soft, nontender, no hepatosplenomegaly, no masses and bowel sounds normal   (female): normal female external genitalia, normal urethral meatus, vaginal mucosa pink, moist, well rugated, and normal cervix/adnexa/uterus without masses or discharge  MS: no gross musculoskeletal defects noted, no edema  SKIN: no suspicious lesions or rashes  NEURO: Normal strength and tone, mentation intact and speech normal  PSYCH: mentation appears normal, affect normal/bright    ASSESSMENT/PLAN:   1. Encounter for gynecological examination without abnormal finding  Declines scheduling colonoscopy at this time, mammogram ordered by FP provider  - Pap imaged thin layer screen with HPV - recommended age 30 - 65 years (select HPV order below)  - HPV High Risk Types DNA Cervical    2. Need for hepatitis C screening test  - Hepatitis C Screen Reflex to HCV RNA Quant and Genotype    3. Screening for diabetes mellitus  - Glucose    4. CARDIOVASCULAR SCREENING; LDL GOAL LESS THAN 130  - Lipid panel reflex to direct LDL Fasting    5. Vitamin D deficiency  - Vitamin D Deficiency    COUNSELING:  Reviewed preventive health counseling, as reflected in patient instructions  Special attention given to:        Regular exercise       Healthy diet/nutrition       Colon cancer screening       Consider Hep C screening for patients born between 1945 and 1965       HIV screeninx in teen years, 1x in adult years, and at intervals if high risk       (Nidhi)menopause management    BP Readings from Last 1 Encounters:   18 129/80     Estimated body mass index is 26.59 kg/(m^2) as calculated from the following:    Height as of this encounter: 5' 5\" (1.651 m).    Weight as of this encounter: 159 lb 12.8 oz (72.5 kg).           reports that she has never smoked. She has never used smokeless " tobacco.      Counseling Resources:  ATP IV Guidelines  Pooled Cohorts Equation Calculator  Breast Cancer Risk Calculator  FRAX Risk Assessment  ICSI Preventive Guidelines  Dietary Guidelines for Americans, 2010  USDA's MyPlate  ASA Prophylaxis  Lung CA Screening    MARTINE Stevenson [facility] [place]  Answers for HPI/ROS submitted by the patient on 11/5/2018   PHQ-2 Score: 0

## 2018-11-05 NOTE — MR AVS SNAPSHOT
"              After Visit Summary   11/5/2018    Sarai Phelps    MRN: [id number]           Patient Information     Date Of Birth          1965        Visit Information        Provider Department      11/5/2018 8:30 AM Sarah Garber APRN CNP [facility]        Today's Diagnoses     Encounter for gynecological examination without abnormal finding    -  1    Need for hepatitis C screening test        Screening for diabetes mellitus        CARDIOVASCULAR SCREENING; LDL GOAL LESS THAN 130        Vitamin D deficiency           Follow-ups after your visit        Who to contact     If you have questions or need follow up information about today's clinic visit or your schedule please contact [facility] directly at [phone].  Normal or non-critical lab and imaging results will be communicated to you by MyChart, letter or phone within 4 business days after the clinic has received the results. If you do not hear from us within 7 days, please contact the clinic through MyChart or phone. If you have a critical or abnormal lab result, we will notify you by phone as soon as possible.  Submit refill requests through [employer] or call your pharmacy and they will forward the refill request to us. Please allow 3 business days for your refill to be completed.          Additional Information About Your Visit        Care EveryWhere ID     This is your Care EveryWhere ID. This could be used by other organizations to access your [facility] medical records  [id number]        Your Vitals Were     Pulse Temperature Height Last Period Pulse Oximetry BMI (Body Mass Index)    69 97.1  F (36.2  C) (Oral) 5' 5\" (1.651 m) 10/12/2018 (Approximate) 100% 26.59 kg/m2       Blood Pressure from Last 3 Encounters:   11/05/18 129/80   10/01/18 135/86   07/02/18 143/88    Weight from Last 3 Encounters:   11/05/18 159 lb 12.8 oz (72.5 kg)   10/01/18 159 lb (72.1 kg)   07/02/18 161 lb (73 kg)              We " Performed the Following     Glucose     Hepatitis C Screen Reflex to HCV RNA Quant and Genotype     HPV High Risk Types DNA Cervical     Lipid panel reflex to direct LDL Fasting     Pap imaged thin layer screen with HPV - recommended age 30 - 65 years (select HPV order below)     Vitamin D Deficiency        Primary Care Provider Office Phone # Fax #    [facility] [phone] [address] [facility] [address]        Equal Access to Services     DANIEL YOUSIF : Hadii aad ku hadasho Soomaali, waaxda luqadaha, qaybta kaalmada adeegyada, waxay idiin hayaan adeeg khfabiensh laedn . So [facility] [place] [facility] [phone].    ATENCIÓN: Si habla espdesiree, tiene a trujillo disposición servicios gratuitos de asistencia lingüística. Llame al [phone].    We comply with applicable federal civil rights laws and [place] laws. We do not discriminate on the basis of race, color, national origin, age, disability, sex, sexual orientation, or gender identity.            Thank you!     Thank you for choosing [facility]  for your care. Our goal is always to provide you with excellent care. Hearing back from our patients is one way we can continue to improve our services. Please take a few minutes to complete the written survey that you may receive in the mail after your visit with us. Thank you!             Your Updated Medication List - Protect others around you: Learn how to safely use, store and throw away your medicines at www.disposemymeds.org.          This list is accurate as of 11/5/18  9:02 AM.  Always use your most recent med list.                   Brand Name Dispense Instructions for use Diagnosis    ADVAIR HFA 45-21 MCG/ACT inhaler   Generic drug:  fluticasone-salmeterol           fluticasone 50 MCG/ACT spray    FLONASE     Spray 1 spray into both nostrils daily        LORATADINE-D 24HR  MG per 24 hr tablet   Generic drug:  loratadine-pseudoePHEDrine           VITAMIN D (CHOLECALCIFEROL)  PO      Take 3,000 Units by mouth daily

## 2018-11-05 NOTE — LETTER
October 23, 2019      Sarai Lenin  [address]  [place] [address]    Dear MsKatie,      At [facility], your health and wellness is our primary concern. That is why we are following up on a positive high risk HPV test from 11/5/18. Your provider had recommended that you have a Pap smear and HPV test completed by 11/5/19. Our records do not show that this has been scheduled.    It is important to complete the follow up that your provider has suggested for you to ensure that there are no worsening changes which may, over time, develop into cancer.      Please contact our office at  [phone] to schedule an appointment for a Pap smear and HPV test at your earliest convenience. If you have questions or concerns, please call the clinic and we will be happy to assist you.    If you have completed the tests outside of [facility], please have the results forwarded to our office. We will update the chart for your primary Physician to review before your next annual physical.     Thank you for choosing [facility]!    Sincerely,      Your [facility] Care Team/nicho

## 2018-11-05 NOTE — NURSING NOTE
"Chief Complaint   Patient presents with     Physical       Initial /80  Pulse 69  Temp 97.1  F (36.2  C) (Oral)  Ht 5' 5\" (1.651 m)  Wt 159 lb 12.8 oz (72.5 kg)  LMP 10/12/2018 (Approximate)  SpO2 100%  BMI 26.59 kg/m2 Estimated body mass index is 26.59 kg/(m^2) as calculated from the following:    Height as of this encounter: 5' 5\" (1.651 m).    Weight as of this encounter: 159 lb 12.8 oz (72.5 kg)..  BP completed using cuff size: renee Kerr CMA    "

## 2018-11-05 NOTE — LETTER
November 12, 2018      Sarai Phelps  [address]  [place] [address]    Dear ,      This letter is in regards to the PAP smear and HPV (Human Papillomavirus) test you had done recently. Your PAP test result is normal, but your HPV (Human Papillomavirus) test was positive.     About 80 percent of women have been exposed to HPV virus throughout their lifetime. There is no medication for the treatment of HPV. Typically your own immune system gets rid of the virus before it does harm. HPV is spread by direct skin-to-skin contact, including sexual intercourse, oral sex, anal sex, or any other contact involving the genital area (example: hand to genital contact). It is not possible to become infected with HPV by touching an object, such as a toilet seat. Most people who are infected with HPV have no signs or symptoms.    Things that you can do to boost your immune system and help your body get rid of HPV: get plenty of rest, eat a well-balanced diet of healthy foods, stop smoking, exercise regularly and decrease stress.    Please return in 1 year to repeat your pap smear and HPV test.     If you have additional questions regarding this result, please call our registered nurse, Elizabeth at [phone].    Sincerely,      MARTINE Stevenson CNP/rlm

## 2018-11-08 LAB
COPATH REPORT: NORMAL
PAP: NORMAL

## 2018-11-09 LAB
FINAL DIAGNOSIS: ABNORMAL
HPV HR 12 DNA CVX QL NAA+PROBE: POSITIVE
HPV16 DNA SPEC QL NAA+PROBE: NEGATIVE
HPV18 DNA SPEC QL NAA+PROBE: NEGATIVE
SPECIMEN DESCRIPTION: ABNORMAL
SPECIMEN SOURCE CVX/VAG CYTO: ABNORMAL

## 2018-11-13 NOTE — PROGRESS NOTES
11/5/18 NIL Pap, + HR HPV (Neg 16/18). Plan cotest in 1 year.   11/12/18 Letter sent by TC.   10/23/19 Cotest reminder letter sent (rlm)  11/27/19 Reminder call to pt, pt will call to schedule (rlm)  12/30/19 Patient is lost to follow-up. Routed to provider as LINDA. (rlm)

## 2019-01-10 ENCOUNTER — OFFICE VISIT (OUTPATIENT)
Dept: ORTHOPEDICS | Facility: CLINIC | Age: 54
End: 2019-01-10
Payer: COMMERCIAL

## 2019-01-10 VITALS
SYSTOLIC BLOOD PRESSURE: 118 MMHG | WEIGHT: 159 LBS | HEART RATE: 81 BPM | BODY MASS INDEX: 26.49 KG/M2 | OXYGEN SATURATION: 100 % | DIASTOLIC BLOOD PRESSURE: 79 MMHG | HEIGHT: 65 IN

## 2019-01-10 DIAGNOSIS — M22.41 CHONDROMALACIA OF RIGHT PATELLA: ICD-10-CM

## 2019-01-10 DIAGNOSIS — M21.41 PES PLANUS OF BOTH FEET: ICD-10-CM

## 2019-01-10 DIAGNOSIS — M21.42 BILATERAL PES PLANUS: Primary | ICD-10-CM

## 2019-01-10 DIAGNOSIS — M21.41 BILATERAL PES PLANUS: Primary | ICD-10-CM

## 2019-01-10 DIAGNOSIS — M21.42 PES PLANUS OF BOTH FEET: ICD-10-CM

## 2019-01-10 PROCEDURE — 99243 OFF/OP CNSLTJ NEW/EST LOW 30: CPT | Performed by: ORTHOPAEDIC SURGERY

## 2019-01-10 ASSESSMENT — MIFFLIN-ST. JEOR: SCORE: 1322.1

## 2019-01-10 ASSESSMENT — PAIN SCALES - GENERAL: PAINLEVEL: MODERATE PAIN (4)

## 2019-01-10 NOTE — LETTER
1/10/2019         RE: Sarai Phelps  [address]  [place] [address]        Dear Colleague,    Thank you for referring your patient, Sarai Phelps, to the [facility]. Please see a copy of my visit note below.    Sarai Phelps is a 53 year old female who is seen in consultation at the request of Divina Jay for right foot pain, but also right knee and right hip.    Past Medical History:   Diagnosis Date     Cervical high risk HPV (human papillomavirus) test positive 11/05/2018    see problem list       Past Surgical History:   Procedure Laterality Date     NO HISTORY OF SURGERY         History reviewed. No pertinent family history.    Social History     Socioeconomic History     Marital status: Single     Spouse name: Not on file     Number of children: Not on file     Years of education: Not on file     Highest education level: Not on file   Social Needs     Financial resource strain: Not on file     Food insecurity - worry: Not on file     Food insecurity - inability: Not on file     Transportation needs - medical: Not on file     Transportation needs - non-medical: Not on file   Occupational History     Not on file   Tobacco Use     Smoking status: Never Smoker     Smokeless tobacco: Never Used   Substance and Sexual Activity     Alcohol use: Yes     Comment: OCC     Drug use: No     Sexual activity: Yes     Partners: Male     Birth control/protection: None   Other Topics Concern     Parent/sibling w/ CABG, MI or angioplasty before 65F 55M? Not Asked   Social History Narrative     Not on file       Current Outpatient Medications   Medication Sig Dispense Refill     ADVAIR HFA 45-21 MCG/ACT inhaler        fluticasone (FLONASE) 50 MCG/ACT spray Spray 1 spray into both nostrils daily       LORATADINE-D 24HR  MG per 24 hr tablet        VITAMIN D, CHOLECALCIFEROL, PO Take 3,000 Units by mouth daily         Allergies   Allergen Reactions     Sulfa Drugs Rash       REVIEW  "OF SYSTEMS:  CONSTITUTIONAL:  NEGATIVE for fever, chills, change in weight, not feeling tired  SKIN:  NEGATIVE for worrisome rashes, no skin lumps, no skin ulcers and no non-healing wounds  EYES:  NEGATIVE for vision changes or irritation.  ENT/MOUTH:  NEGATIVE.  No hearing loss, no hoarseness, no difficulty swallowing.  RESP:  NEGATIVE. No cough or shortness of breath.  CV:  NEGATIVE for chest pain, palpitations or peripheral edema  GI:  NEGATIVE for nausea, abdominal pain, heartburn, or change in bowel habits  :  Negative. No dysuria, no hematuria  MUSCULOSKELETAL:  See HPI above  NEURO:  NEGATIVE . No headaches, no dizziness,  no numbness  ENDOCRINE:  NEGATIVE for temperature intolerance, skin/hair changes  HEME/ALLERGY/IMMUNE:  NEGATIVE for bleeding problems  PSYCHIATRIC:  NEGATIVE. no anxiety, no depression.      Exam:  Vitals: /79 (BP Location: Left arm, Patient Position: Sitting, Cuff Size: Adult Regular)   Pulse 81   Ht 1.651 m (5' 5\")   Wt 72.1 kg (159 lb)   SpO2 100%   BMI 26.46 kg/m     BMI= Body mass index is 26.46 kg/m .  Constitutional:  healthy, alert and no distress  Neuro: Alert and Oriented x 3, Sensation grossly WNL.  HEENT:  Atraumatic, EOMI  Neck:  Neck supple with no tenderness.  Psych: Affect normal   Respiratory: Breathing not labored.  Cardiovascular: normal peripheral pulses  Lymph: no adenopathy  Skin: No rashes,worrisome lesions or skin problems  Spine: straight, no straight leg raising pain.  Hips show full range of motion.  There is no tenderness over the sacro-iliac joints, sciatic notch, or greater trochanters.       Again, thank you for allowing me to participate in the care of your patient.        Sincerely,        Sammy Shine MD    "

## 2019-01-11 PROBLEM — M21.41 PES PLANUS OF BOTH FEET: Status: ACTIVE | Noted: 2019-01-11

## 2019-01-11 PROBLEM — M22.41 CHONDROMALACIA OF RIGHT PATELLA: Status: ACTIVE | Noted: 2019-01-11

## 2019-01-11 PROBLEM — M21.42 PES PLANUS OF BOTH FEET: Status: ACTIVE | Noted: 2019-01-11

## 2019-01-11 NOTE — PROGRESS NOTES
Sarai Phelps is a 53 year old female who is seen in consultation at the request of Divina Jay for right foot pain, but also right knee and right hip.    Past Medical History:   Diagnosis Date     Cervical high risk HPV (human papillomavirus) test positive 11/05/2018    see problem list       Past Surgical History:   Procedure Laterality Date     NO HISTORY OF SURGERY         History reviewed. No pertinent family history.    Social History     Socioeconomic History     Marital status: Single     Spouse name: Not on file     Number of children: Not on file     Years of education: Not on file     Highest education level: Not on file   Social Needs     Financial resource strain: Not on file     Food insecurity - worry: Not on file     Food insecurity - inability: Not on file     Transportation needs - medical: Not on file     Transportation needs - non-medical: Not on file   Occupational History     Not on file   Tobacco Use     Smoking status: Never Smoker     Smokeless tobacco: Never Used   Substance and Sexual Activity     Alcohol use: Yes     Comment: OCC     Drug use: No     Sexual activity: Yes     Partners: Male     Birth control/protection: None   Other Topics Concern     Parent/sibling w/ CABG, MI or angioplasty before 65F 55M? Not Asked   Social History Narrative     Not on file       Current Outpatient Medications   Medication Sig Dispense Refill     ADVAIR HFA 45-21 MCG/ACT inhaler        fluticasone (FLONASE) 50 MCG/ACT spray Spray 1 spray into both nostrils daily       LORATADINE-D 24HR  MG per 24 hr tablet        VITAMIN D, CHOLECALCIFEROL, PO Take 3,000 Units by mouth daily         Allergies   Allergen Reactions     Sulfa Drugs Rash       REVIEW OF SYSTEMS:  CONSTITUTIONAL:  NEGATIVE for fever, chills, change in weight, not feeling tired  SKIN:  NEGATIVE for worrisome rashes, no skin lumps, no skin ulcers and no non-healing wounds  EYES:  NEGATIVE for vision changes or  "irritation.  ENT/MOUTH:  NEGATIVE.  No hearing loss, no hoarseness, no difficulty swallowing.  RESP:  NEGATIVE. No cough or shortness of breath.  CV:  NEGATIVE for chest pain, palpitations or peripheral edema  GI:  NEGATIVE for nausea, abdominal pain, heartburn, or change in bowel habits  :  Negative. No dysuria, no hematuria  MUSCULOSKELETAL:  See HPI above  NEURO:  NEGATIVE . No headaches, no dizziness,  no numbness  ENDOCRINE:  NEGATIVE for temperature intolerance, skin/hair changes  HEME/ALLERGY/IMMUNE:  NEGATIVE for bleeding problems  PSYCHIATRIC:  NEGATIVE. no anxiety, no depression.      Exam:  Vitals: /79 (BP Location: Left arm, Patient Position: Sitting, Cuff Size: Adult Regular)   Pulse 81   Ht 1.651 m (5' 5\")   Wt 72.1 kg (159 lb)   SpO2 100%   BMI 26.46 kg/m    BMI= Body mass index is 26.46 kg/m .  Constitutional:  healthy, alert and no distress  Neuro: Alert and Oriented x 3, Sensation grossly WNL.  HEENT:  Atraumatic, EOMI  Neck:  Neck supple with no tenderness.  Psych: Affect normal   Respiratory: Breathing not labored.  Cardiovascular: normal peripheral pulses  Lymph: no adenopathy  Skin: No rashes,worrisome lesions or skin problems  Spine: straight, no straight leg raising pain.  Hips show full range of motion.  There is no tenderness over the sacro-iliac joints, sciatic notch, or greater trochanters.     "

## 2019-01-12 NOTE — PROGRESS NOTES
Visit Date:   01/10/2019      HISTORY OF PRESENT ILLNESS:  Ms. Phelps is a 53-year-old female seen in consultation at the request of Divina Jay for evaluation of right foot pain, but also right knee and hip pain.  She has had a foot and ankle pain for the last year.  She has had bad sprains in the past, maybe 30 years ago, but had a sprain now about a year ago and began to have pain with walking.  The right foot, especially hurts primarily over the medial and lateral aspect and she has also pain at the anterior portion of the knee and the lateral aspect of the hip.  Pain with walking and resting.  She has aching pain rated 4/10.  Ankle x-ray has been performed showing no arthritic changes, no fractures.      PHYSICAL EXAMINATION:  Shows good mobility of the hips without pain.  She does have tenderness over the greater trochanter on the right, negative on the left.  She has good mobility of the knees.  Does have some patellofemoral crepitation and some tenderness anteriorly.  Does not have any ligamentous laxity or effusions.  She had negative Xander tests.  Ankles show tenderness primarily laterally at the ankle near the fifth metatarsal base and near the peroneal tendons.  She had no pain, however, with resisted peroneal tendon use.  She has mild pes planus on both feet, a bit on the right than the left.  She had no tenderness over the posterior tibial tendon, however.  She has good mobility of the ankle.  No tenderness at the Achilles or the plantar fascia.      IMPRESSION:  I feel most likely this is pes planus with irritation to the lateral aspect of the ankle and compression.  Orthotics may significantly help this.  We will also have physical therapy show her strengthening of the posterior tibial tendon and peroneals.  I do not see a specific problem with the knee or hip and suspect that control of the foot may help the knee and hip significantly.  We will see back ppat.         STEFANY FLORES MD              D: 2019   T: 2019   MT: OMEGA      Name:     LARRY CARPENTER   MRN:      [id number]        Account:      [id number]   :      1965           Visit Date:   01/10/2019      Document: [id number]

## 2019-01-16 ENCOUNTER — TELEPHONE (OUTPATIENT)
Dept: ORTHOPEDICS | Facility: CLINIC | Age: 54
End: 2019-01-16

## 2019-01-16 DIAGNOSIS — M79.671 RIGHT FOOT PAIN: ICD-10-CM

## 2019-01-16 DIAGNOSIS — M25.571 RIGHT ANKLE PAIN, UNSPECIFIED CHRONICITY: Primary | ICD-10-CM

## 2019-01-16 DIAGNOSIS — M21.40 PES PLANUS, UNSPECIFIED LATERALITY: ICD-10-CM

## 2019-01-16 NOTE — TELEPHONE ENCOUNTER
Reason for call:  Other   Patient called regarding (reason for call): call back  Additional comments: Patient is calling wanting to discuss physical therapy. Please call back    Phone number to reach patient:  Work number on file:  [phone] (work)    Best Time:  any    Can we leave a detailed message on this number?  YES

## 2019-01-17 NOTE — TELEPHONE ENCOUNTER
This has been ordered and faxed to the number provided. Left a message for the patient notifying her of this.    Sacha Whelan PA-C  Supervising physician: Sammy Shine MD  Dept. of Orthopedics  [facility]

## 2019-01-17 NOTE — TELEPHONE ENCOUNTER
Tried calling Sarai but was unable to reach her. Left a message with our callback number.    TUNDE PinonC  Supervising physician: Sammy Shine MD  Dept. of Orthopedics  [facility]

## 2019-08-06 ENCOUNTER — OFFICE VISIT (OUTPATIENT)
Dept: FAMILY MEDICINE | Facility: CLINIC | Age: 54
End: 2019-08-06
Payer: COMMERCIAL

## 2019-08-06 VITALS
SYSTOLIC BLOOD PRESSURE: 133 MMHG | TEMPERATURE: 97.8 F | DIASTOLIC BLOOD PRESSURE: 79 MMHG | BODY MASS INDEX: 26.63 KG/M2 | WEIGHT: 160 LBS | HEART RATE: 80 BPM

## 2019-08-06 DIAGNOSIS — S61.452A CAT BITE OF LEFT HAND WITH INFECTION, INITIAL ENCOUNTER: Primary | ICD-10-CM

## 2019-08-06 DIAGNOSIS — L03.818: ICD-10-CM
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
## 2019-08-06 DIAGNOSIS — L08.9 CAT BITE OF LEFT HAND WITH INFECTION, INITIAL ENCOUNTER: Primary | ICD-10-CM

## 2019-08-06 PROCEDURE — 99213 OFFICE O/P EST LOW 20 MIN: CPT | Performed by: PHYSICIAN ASSISTANT

## 2019-08-06 NOTE — PROGRESS NOTES
Subjective     Sarai Phelps is a 53 year old female who presents to clinic today for the following health issues:    HPI   Patient c/o cat bite X 1 day on left hand. Patient now having pain, swelling, redness.  Her cat- current on rabies vaccinations. 11:30 yesterday while cutting cat's nails. Red and puffy today. No fever  Tdap 6/13/2017    Allergies   Allergen Reactions     Sulfa Drugs Rash       Past Medical History:   Diagnosis Date     Cervical high risk HPV (human papillomavirus) test positive 11/05/2018    see problem list         Current Outpatient Medications on File Prior to Visit:  ADVAIR HFA 45-21 MCG/ACT inhaler    fluticasone (FLONASE) 50 MCG/ACT spray Spray 1 spray into both nostrils daily   LORATADINE-D 24HR  MG per 24 hr tablet    VITAMIN D, CHOLECALCIFEROL, PO Take 3,000 Units by mouth daily     No current facility-administered medications on file prior to visit.     Social History     Tobacco Use     Smoking status: Never Smoker     Smokeless tobacco: Never Used   Substance Use Topics     Alcohol use: Yes     Comment: OCC       ROS:  CONSTITUTIONAL: Negative for fatigue or fever.  EYES: Negative for eye problems.  ENT: neg.  RESP: neg.  CV: Negative for chest pains.  GI: Negative for vomiting.  MUSCULOSKELETAL:  Negative for significant muscle or joint pains.  NEUROLOGIC: Negative for headaches.  SKIN: as above  Psych- nl mentation    OBJECTIVE:  /79   Pulse 80   Temp 97.8  F (36.6  C) (Oral)   Wt 72.6 kg (160 lb)   BMI 26.63 kg/m    GENERAL APPEARANCE: Healthy, alert and no distress.  EYES:Conjunctiva/sclera clear.  EARS: No cerumen.   Ear canals w/o erythema.  TM's intact w/o erythema.    NOSE/MOUTH: Nose without ulcers, erythema or lesions.  SINUSES: No maxillary sinus tenderness.  THROAT: No erythema w/o tonsillar enlargement . No exudates.  NECK: Supple, nontender, no lymphadenopathy.  RESP: Lungs clear to auscultation - no rales, rhonchi or wheezes  CV: Regular rate and  rhythm, normal S1 S2, no murmur noted.  NEURO: Awake, alert    SKIN: No rashes    Left  Lateral dorsal hand is with a puncture wound, excoriated. Tender. Confluent redness to distal wrist,  mild swelling. FROM wrist, NT. FROM of all fingers.    Circulation/senation intact.    ASSESSMENT:     ICD-10-CM    1. Cat bite of left hand with infection, initial encounter S61.452A amoxicillin-clavulanate (AUGMENTIN) 875-125 MG tablet    L08.9     W55.01XA    2. Cellulitis of skin area excluding digits of hand or foot L03.818 amoxicillin-clavulanate (AUGMENTIN) 875-125 MG tablet           PLAN:  Patient Instructions   Warm soaks in Epsom salts 2 times a day  To ER if not better in 2 days, sooner if redness/swelling worsens.     Lots of rest and fluids.  Minal Marino PA-C

## 2019-08-08 ENCOUNTER — OFFICE VISIT (OUTPATIENT)
Dept: URGENT CARE | Facility: URGENT CARE | Age: 54
End: 2019-08-08
Payer: COMMERCIAL

## 2019-08-08 VITALS
HEIGHT: 64 IN | WEIGHT: 161 LBS | SYSTOLIC BLOOD PRESSURE: 127 MMHG | HEART RATE: 93 BPM | TEMPERATURE: 98.2 F | OXYGEN SATURATION: 100 % | BODY MASS INDEX: 27.49 KG/M2 | DIASTOLIC BLOOD PRESSURE: 83 MMHG
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
## 2019-08-08 DIAGNOSIS — S61.452D CAT BITE OF HAND, LEFT, SUBSEQUENT ENCOUNTER: Primary | ICD-10-CM

## 2019-08-08 PROCEDURE — 99213 OFFICE O/P EST LOW 20 MIN: CPT | Performed by: FAMILY MEDICINE

## 2019-08-08 ASSESSMENT — MIFFLIN-ST. JEOR: SCORE: 1315.29

## 2019-08-09 NOTE — PROGRESS NOTES
"Chief complaint: left hand    Was seen 2 days ago with cat bite  Was prescribed with augmentin  Patient has been soaking   Seems overall about 30-40% improved as far as swelling and redness   Patient here for follow up   No fevers or chills chest pain or shortness of breath     Allergies   Allergen Reactions     Sulfa Drugs Rash       Past Medical History:   Diagnosis Date     Cervical high risk HPV (human papillomavirus) test positive 11/05/2018    see problem list         Current Outpatient Medications on File Prior to Visit:  ADVAIR HFA 45-21 MCG/ACT inhaler    amoxicillin-clavulanate (AUGMENTIN) 875-125 MG tablet Take 1 tablet by mouth 2 times daily   fluticasone (FLONASE) 50 MCG/ACT spray Spray 1 spray into both nostrils daily   LORATADINE-D 24HR  MG per 24 hr tablet    VITAMIN D, CHOLECALCIFEROL, PO Take 3,000 Units by mouth daily     No current facility-administered medications on file prior to visit.     Social History     Tobacco Use     Smoking status: Never Smoker     Smokeless tobacco: Never Used   Substance Use Topics     Alcohol use: Yes     Comment: OCC     Drug use: No       ROS:   review of systems negative except for noted above.   No thoughts of harming self or others.     OBJECTIVE:  /83   Pulse 93   Temp 98.2  F (36.8  C) (Oral)   Ht 1.626 m (5' 4\")   Wt 73 kg (161 lb)   SpO2 100%   BMI 27.64 kg/m     General:   awake, alert, and cooperative.  NAD.   Head: Normocephalic, atraumatic.  Eyes: Conjunctiva clear,   Neuro: Alert and oriented - normal speech.  MS: Using extremities freely  PSYCH:  Normal affect, normal speech  SKIN:   Mild erythema over the dorsum of the left hand, very minimal swelling  Puncture wound appears to be healing   , No purulent discharge, No fluctuation.     ASSESSMENT:    ICD-10-CM    1. Cat bite of hand, left, subsequent encounter S61.452D     W55.01XD        PLAN:   Improving  Continue augmentin  Watch for any worsening redness warmth swelling " tenderness or inflammation or purulent discharge. If with any of these please come in immediately to be re-evaluated  Please come in immediately also if with any fever or chills  Adverse reactions of medications discussed.  Over the counter medications discussed.   Aware to come back in if with worsening symptoms or if no relief despite treatment plan  Patient voiced understanding and had no further questions.     Follow up:  Primary care provider in 3-4 days if persistent   Advised about symptoms which might herald more serious problems.        Sherri Tellez MD

## 2020-02-21 ENCOUNTER — OFFICE VISIT (OUTPATIENT)
Dept: OBGYN | Facility: CLINIC | Age: 55
End: 2020-02-21
Payer: COMMERCIAL

## 2020-02-21 VITALS
OXYGEN SATURATION: 100 % | SYSTOLIC BLOOD PRESSURE: 125 MMHG | TEMPERATURE: 97.9 F | HEIGHT: 64 IN | DIASTOLIC BLOOD PRESSURE: 81 MMHG | HEART RATE: 76 BPM | BODY MASS INDEX: 27.55 KG/M2 | WEIGHT: 161.4 LBS

## 2020-02-21 DIAGNOSIS — Z83.49 FAMILY HISTORY OF THYROID DISEASE: ICD-10-CM

## 2020-02-21 DIAGNOSIS — Z12.31 VISIT FOR SCREENING MAMMOGRAM: ICD-10-CM

## 2020-02-21 DIAGNOSIS — R87.810 CERVICAL HIGH RISK HPV (HUMAN PAPILLOMAVIRUS) TEST POSITIVE: ICD-10-CM

## 2020-02-21 DIAGNOSIS — Z13.6 CARDIOVASCULAR SCREENING; LDL GOAL LESS THAN 130: ICD-10-CM

## 2020-02-21 DIAGNOSIS — Z13.1 SCREENING FOR DIABETES MELLITUS: ICD-10-CM

## 2020-02-21 DIAGNOSIS — Z01.419 ENCOUNTER FOR GYNECOLOGICAL EXAMINATION WITHOUT ABNORMAL FINDING: Primary | ICD-10-CM

## 2020-02-21 LAB
CHOLEST SERPL-MCNC: 172 MG/DL
DEPRECATED CALCIDIOL+CALCIFEROL SERPL-MC: 39 UG/L (ref 20–75)
GLUCOSE SERPL-MCNC: 90 MG/DL (ref 70–99)
HDLC SERPL-MCNC: 77 MG/DL
LDLC SERPL CALC-MCNC: 81 MG/DL
NONHDLC SERPL-MCNC: 95 MG/DL
TRIGL SERPL-MCNC: 70 MG/DL
TSH SERPL DL<=0.005 MIU/L-ACNC: 3.53 MU/L (ref 0.4–4)

## 2020-02-21 PROCEDURE — 99396 PREV VISIT EST AGE 40-64: CPT | Performed by: NURSE PRACTITIONER

## 2020-02-21 PROCEDURE — 82947 ASSAY GLUCOSE BLOOD QUANT: CPT | Performed by: NURSE PRACTITIONER

## 2020-02-21 PROCEDURE — 36415 COLL VENOUS BLD VENIPUNCTURE: CPT | Performed by: NURSE PRACTITIONER

## 2020-02-21 PROCEDURE — 84443 ASSAY THYROID STIM HORMONE: CPT | Performed by: NURSE PRACTITIONER

## 2020-02-21 PROCEDURE — 80061 LIPID PANEL: CPT | Performed by: NURSE PRACTITIONER

## 2020-02-21 PROCEDURE — 82306 VITAMIN D 25 HYDROXY: CPT | Performed by: NURSE PRACTITIONER

## 2020-02-21 PROCEDURE — 88175 CYTOPATH C/V AUTO FLUID REDO: CPT | Performed by: NURSE PRACTITIONER

## 2020-02-21 PROCEDURE — 87624 HPV HI-RISK TYP POOLED RSLT: CPT | Performed by: NURSE PRACTITIONER

## 2020-02-21 ASSESSMENT — PAIN SCALES - GENERAL: PAINLEVEL: NO PAIN (0)

## 2020-02-21 ASSESSMENT — MIFFLIN-ST. JEOR: SCORE: 1312.11

## 2020-02-21 NOTE — PROGRESS NOTES
SUBJECTIVE:   CC: Sarai Phelps is an 54 year old woman who presents for preventive health visit.     Healthy Habits:     Getting at least 3 servings of Calcium per day:  Yes    Bi-annual eye exam:  Yes    Dental care twice a year:  Yes    Sleep apnea or symptoms of sleep apnea:  None    Diet:  Regular (no restrictions)    Frequency of exercise:  6-7 days/week    Duration of exercise:  30-45 minutes    Taking medications regularly:  Yes    Medication side effects:  Not applicable    PHQ-2 Total Score: 0    Additional concerns today:  Yes    Would like Thyroid testing. Sister recently diagnosed with hypothyroidism. Also requesting Vitamin D.    Today's PHQ-2 Score:   PHQ-2 ( 1999 [employer]) 2/19/2020   Q1: Little interest or pleasure in doing things 0   Q2: Feeling down, depressed or hopeless 0   PHQ-2 Score 0   Q1: Little interest or pleasure in doing things Not at all   Q2: Feeling down, depressed or hopeless Not at all   PHQ-2 Score 0       Abuse: Current or Past(Physical, Sexual or Emotional)- No  Do you feel safe in your environment? Yes        Social History     Tobacco Use     Smoking status: Never Smoker     Smokeless tobacco: Never Used   Substance Use Topics     Alcohol use: Yes     Comment: OCC         Alcohol Use 2/19/2020   Prescreen: >3 drinks/day or >7 drinks/week? No   Prescreen: >3 drinks/day or >7 drinks/week? -   No flowsheet data found.    Reviewed orders with patient.  Reviewed health maintenance and updated orders accordingly - Yes  Patient Active Problem List   Diagnosis     Mild persistent asthma without complication     Vitamin D deficiency     Cervical high risk HPV (human papillomavirus) test positive     Pes planus of both feet     Chondromalacia of right patella     Past Surgical History:   Procedure Laterality Date     NO HISTORY OF SURGERY         Social History     Tobacco Use     Smoking status: Never Smoker     Smokeless tobacco: Never Used   Substance Use Topics     Alcohol  "use: Yes     Comment: OCC     History reviewed. No pertinent family history.        Mammogram Screening: Patient over age 50, mutual decision to screen reflected in health maintenance.    Pertinent mammograms are reviewed under the imaging tab.  History of abnormal Pap smear: YES - updated in Problem List and Health Maintenance accordingly  PAP / HPV Latest Ref Rng & Units 11/5/2018   PAP - NIL   HPV 16 DNA NEG:Negative Negative   HPV 18 DNA NEG:Negative Negative   OTHER HR HPV NEG:Negative Positive(A)     Reviewed and updated as needed this visit by clinical staff  Tobacco  Allergies  Meds  Med Hx  Surg Hx  Fam Hx  Soc Hx        Reviewed and updated as needed this visit by Provider        Past Medical History:   Diagnosis Date     Cervical high risk HPV (human papillomavirus) test positive 11/05/2018    see problem list      Past Surgical History:   Procedure Laterality Date     NO HISTORY OF SURGERY         Review of Systems  CONSTITUTIONAL: NEGATIVE for fever, chills, change in weight  INTEGUMENTARU/SKIN: NEGATIVE for worrisome rashes, moles or lesions  EYES: NEGATIVE for vision changes or irritation  ENT: NEGATIVE for ear, mouth and throat problems  RESP: NEGATIVE for significant cough or SOB  BREAST: NEGATIVE for masses, tenderness or discharge  CV: NEGATIVE for chest pain, palpitations or peripheral edema  GI: NEGATIVE for nausea, abdominal pain, heartburn, or change in bowel habits  : NEGATIVE for unusual urinary or vaginal symptoms. Periods are continuing to be irregular.  MUSCULOSKELETAL: NEGATIVE for significant arthralgias or myalgia  NEURO: NEGATIVE for weakness, dizziness or paresthesias  PSYCHIATRIC: NEGATIVE for changes in mood or affect     OBJECTIVE:   /81 (BP Location: Right arm, Patient Position: Sitting, Cuff Size: Adult Regular)   Pulse 76   Temp 97.9  F (36.6  C) (Oral)   Ht 1.626 m (5' 4\")   Wt 73.2 kg (161 lb 6.4 oz)   LMP 02/09/2020 (Exact Date)   SpO2 100%   BMI 27.70 " kg/m    Physical Exam  GENERAL: healthy, alert and no distress  EYES: Eyes grossly normal to inspection, PERRL and conjunctivae and sclerae normal  HENT: ear canals and TM's normal, nose and mouth without ulcers or lesions  NECK: no adenopathy, no asymmetry, masses, or scars and thyroid normal to palpation  RESP: lungs clear to auscultation - no rales, rhonchi or wheezes  BREAST: normal without masses, tenderness or nipple discharge and no palpable axillary masses or adenopathy  CV: regular rate and rhythm, normal S1 S2, no S3 or S4, no murmur, click or rub, no peripheral edema and peripheral pulses strong  ABDOMEN: soft, nontender, no hepatosplenomegaly, no masses and bowel sounds normal   (female): normal female external genitalia, normal urethral meatus, vaginal mucosa pink, moist, well rugated, and normal cervix/adnexa/uterus without masses or discharge  MS: no gross musculoskeletal defects noted, no edema  SKIN: no suspicious lesions or rashes  NEURO: Normal strength and tone, mentation intact and speech normal  PSYCH: mentation appears normal, affect normal/bright    ASSESSMENT/PLAN:   1. Encounter for gynecological examination without abnormal finding  Declines order for colonoscopy or FIT at this time.  - Pap imaged thin layer diagnostic only  - HPV High Risk Types DNA Cervical  - Vitamin D Deficiency    2. Visit for screening mammogram  - MA Screening Digital Bilateral; Future    3. Cervical high risk HPV (human papillomavirus) test positive  Follow up to be based on results.    4. Screening for diabetes mellitus  - Glucose    5. CARDIOVASCULAR SCREENING; LDL GOAL LESS THAN 130  - Lipid panel reflex to direct LDL Fasting    6. Family history of thyroid disease  - TSH with free T4 reflex    COUNSELING:  Reviewed preventive health counseling, as reflected in patient instructions  Special attention given to:        Regular exercise       Healthy diet/nutrition       Colon cancer screening        "(Nidhi)menopause management    Estimated body mass index is 27.7 kg/m  as calculated from the following:    Height as of this encounter: 1.626 m (5' 4\").    Weight as of this encounter: 73.2 kg (161 lb 6.4 oz).         reports that she has never smoked. She has never used smokeless tobacco.      Counseling Resources:  ATP IV Guidelines  Pooled Cohorts Equation Calculator  Breast Cancer Risk Calculator  FRAX Risk Assessment  ICSI Preventive Guidelines  Dietary Guidelines for Americans, 2010  USDA's MyPlate  ASA Prophylaxis  Lung CA Screening    MARTINE Stevenson CNP  [facility]  "

## 2020-02-25 LAB
COPATH REPORT: NORMAL
PAP: NORMAL

## 2020-02-26 PROBLEM — R87.810 CERVICAL HIGH RISK HPV (HUMAN PAPILLOMAVIRUS) TEST POSITIVE: Status: ACTIVE | Noted: 2018-11-05

## 2020-02-26 LAB
FINAL DIAGNOSIS: NORMAL
HPV HR 12 DNA CVX QL NAA+PROBE: NEGATIVE
HPV16 DNA SPEC QL NAA+PROBE: NEGATIVE
HPV18 DNA SPEC QL NAA+PROBE: NEGATIVE
SPECIMEN DESCRIPTION: NORMAL
SPECIMEN SOURCE CVX/VAG CYTO: NORMAL

## 2020-10-26 ENCOUNTER — E-VISIT (OUTPATIENT)
Dept: OBGYN | Facility: CLINIC | Age: 55
End: 2020-10-26
Payer: COMMERCIAL

## 2020-10-26 DIAGNOSIS — Z53.9 DIAGNOSIS NOT YET DEFINED: Primary | ICD-10-CM

## 2020-10-26 NOTE — TELEPHONE ENCOUNTER
Not appropriate for E-visit. Would recommend clinic appointment to discuss/evaluate. If she really prefers not to come into clinic, can start via telephone visit. Will not bill for the E-visit. Sarah FARLEY CNP

## 2020-10-26 NOTE — TELEPHONE ENCOUNTER
Noted. RN sent patient a [facility] message relaying provider message below.     Claudia Rubio RN on 10/26/2020 at 1:18 PM

## 2020-10-27 NOTE — PROGRESS NOTES
Subjective     Sarai Phelps is a 55 year old female who presents to clinic today for the following health issues:    HPI     Menstrual Concern  Onset/Duration: Per pt been having menstrual period since Oct 10th to Nov. 3rd. Just quit. Was heavy per patient for part of it. Declines hemoglobin today  but if fatigue returns we will get this.     Description:   Duration of bleeding episodes: 2-3 days  Describe bleeding/flow:   Clots: YES- some per pt  Number of pads/day: 4        Cramping: mild  Accompanying Signs & Symptoms:  Lightheadedness: no  Temperature intolerance: no  Nosebleeds/Easy bruising: no  Vaginal Discharge: no  Acne: no  Change in body hair: no  History:  Patient's last menstrual period was 10/10/2020 (approximate).  Previous normal periods: YES  Contraceptive use: NO  Sexually active: YES  Any bleeding after intercourse: no  Abnormal PAP Smears: no    Therapies tried and outcome: None      Has been skipping periods sometimes. This one lasted longer than normal per patient.     No cramping or pain with this per patient. No dizziness.   Can't tolerate PO iron, even small amount in one a day.     No constitutional symptoms.           Review of Systems   Constitutional, HEENT, cardiovascular, pulmonary, GI, , musculoskeletal, neuro, skin, endocrine and psych systems are negative, except as otherwise noted.      Objective    /72   Pulse 82   Temp 97  F (36.1  C) (Tympanic)   Resp 14   Wt 73.5 kg (162 lb)   LMP 10/10/2020 (Approximate)   SpO2 100%   Breastfeeding No   BMI 27.81 kg/m    Body mass index is 27.81 kg/m .  Physical Exam   GENERAL: healthy, alert and no distress  RESP: lungs clear to auscultation - no rales, rhonchi or wheezes  CV: regular rate and rhythm, normal S1 S2, no S3 or S4, no murmur, click or rub, no peripheral edema and peripheral pulses strong  MS: no gross musculoskeletal defects noted, no edema  NEURO: Normal strength and tone, mentation intact and speech  normal  PSYCH: mentation appears normal, affect normal/bright            Assessment & Plan         Menorrhagia with irregular cycle  perimenopausal  See below for plan  She will schedule f/u with her OBGYN also prn  Patient prefers to wait and see what her next cycle brings, or if she gets another cycle  Could have fibroids, etc. Which US would show      - norethindrone (AYGESTIN) 5 MG tablet; Take 2 tablets (10 mg) by mouth daily for 10 doses  - US Pelvic Complete with Transvaginal; Future  Patient Instructions   Call [facility] to schedule pelvic ultrasound if bleeding returns and lasts more than seven days again, call  [phone] . You can also fill the progesterone pill to help get it to stop also.  If you get tired again I would get a hemoglobin checked also.                 Return in about 4 weeks (around 12/3/2020) for if not improving.    Divina Jay PA-C  [facility]

## 2020-11-05 ENCOUNTER — OFFICE VISIT (OUTPATIENT)
Dept: FAMILY MEDICINE | Facility: CLINIC | Age: 55
End: 2020-11-05
Payer: COMMERCIAL

## 2020-11-05 VITALS
RESPIRATION RATE: 14 BRPM | SYSTOLIC BLOOD PRESSURE: 120 MMHG | TEMPERATURE: 97 F | WEIGHT: 162 LBS | HEART RATE: 82 BPM | OXYGEN SATURATION: 100 % | DIASTOLIC BLOOD PRESSURE: 72 MMHG | BODY MASS INDEX: 27.81 KG/M2

## 2020-11-05 DIAGNOSIS — N92.1 MENORRHAGIA WITH IRREGULAR CYCLE: Primary | ICD-10-CM

## 2020-11-05 PROBLEM — Z71.89 ADVANCED DIRECTIVES, COUNSELING/DISCUSSION: Status: ACTIVE | Noted: 2020-11-05

## 2020-11-05 PROCEDURE — 99214 OFFICE O/P EST MOD 30 MIN: CPT | Performed by: PHYSICIAN ASSISTANT

## 2020-11-05 RX ORDER — NORETHINDRONE ACETATE 5 MG
10 TABLET ORAL DAILY
Qty: 20 TABLET | Refills: 0 | Status: SHIPPED | OUTPATIENT
Start: 2020-11-05 | End: 2020-11-15

## 2020-11-05 NOTE — PATIENT INSTRUCTIONS
Call [facility] to schedule pelvic ultrasound if bleeding returns and lasts more than seven days again, call  [phone] . You can also fill the progesterone pill to help get it to stop also.  If you get tired again I would get a hemoglobin checked also.

## 2020-11-06 ASSESSMENT — ASTHMA QUESTIONNAIRES: ACT_TOTALSCORE: 25

## 2020-11-25 ENCOUNTER — E-VISIT (OUTPATIENT)
Dept: URGENT CARE | Facility: URGENT CARE | Age: 55
End: 2020-11-25
Payer: COMMERCIAL

## 2020-11-25 DIAGNOSIS — J02.9 SORE THROAT: ICD-10-CM

## 2020-11-25 DIAGNOSIS — Z20.822 SUSPECTED COVID-19 VIRUS INFECTION: ICD-10-CM

## 2020-11-25 PROCEDURE — 99421 OL DIG E/M SVC 5-10 MIN: CPT | Performed by: PHYSICIAN ASSISTANT

## 2020-11-27 DIAGNOSIS — Z20.822 SUSPECTED COVID-19 VIRUS INFECTION: ICD-10-CM

## 2020-11-27 LAB
DEPRECATED S PYO AG THROAT QL EIA: NEGATIVE
SPECIMEN SOURCE: NORMAL

## 2020-11-27 PROCEDURE — 99N1174 PR STATISTIC STREP A RAPID: Performed by: PHYSICIAN ASSISTANT

## 2020-11-27 PROCEDURE — U0003 INFECTIOUS AGENT DETECTION BY NUCLEIC ACID (DNA OR RNA); SEVERE ACUTE RESPIRATORY SYNDROME CORONAVIRUS 2 (SARS-COV-2) (CORONAVIRUS DISEASE [COVID-19]), AMPLIFIED PROBE TECHNIQUE, MAKING USE OF HIGH THROUGHPUT TECHNOLOGIES AS DESCRIBED BY CMS-2020-01-R: HCPCS | Performed by: PHYSICIAN ASSISTANT

## 2020-11-27 PROCEDURE — 87651 STREP A DNA AMP PROBE: CPT | Performed by: PHYSICIAN ASSISTANT

## 2020-11-28 LAB
SPECIMEN SOURCE: NORMAL
STREP GROUP A PCR: NOT DETECTED

## 2020-11-29 LAB
SARS-COV-2 RNA SPEC QL NAA+PROBE: NOT DETECTED
SPECIMEN SOURCE: NORMAL

## 2021-08-10 ENCOUNTER — OFFICE VISIT (OUTPATIENT)
Dept: URGENT CARE | Facility: URGENT CARE | Age: 56
End: 2021-08-10
Payer: COMMERCIAL

## 2021-08-10 VITALS
DIASTOLIC BLOOD PRESSURE: 88 MMHG | WEIGHT: 154 LBS | OXYGEN SATURATION: 98 % | HEART RATE: 84 BPM | BODY MASS INDEX: 26.43 KG/M2 | RESPIRATION RATE: 16 BRPM | SYSTOLIC BLOOD PRESSURE: 133 MMHG | TEMPERATURE: 97.4 F

## 2021-08-10 DIAGNOSIS — H61.21 IMPACTED CERUMEN OF RIGHT EAR: Primary | ICD-10-CM

## 2021-08-10 PROCEDURE — 99213 OFFICE O/P EST LOW 20 MIN: CPT | Performed by: NURSE PRACTITIONER

## 2021-08-10 ASSESSMENT — PAIN SCALES - GENERAL: PAINLEVEL: NO PAIN (0)

## 2021-08-10 NOTE — PROGRESS NOTES
SUBJECTIVE:   Sarai Phelps is a 55 year old female presenting with a chief complaint of   Chief Complaint   Patient presents with     Ent Problem     Ears have been plugged for the past 8 days.       She is an established patient of [facility].    Plugged ears    Onset of symptoms was 8 day(s) ago.  Course of illness is worsening.    Severity moderate  Current and Associated symptoms: ears are plugged, worse on the right side  Treatment measures tried include None tried.  Predisposing factors include: None.        Review of Systems   HENT:        Ears are plugged   All other systems reviewed and are negative.      Past Medical History:   Diagnosis Date     Cervical high risk HPV (human papillomavirus) test positive 11/05/2018    see problem list     No family history on file.  Current Outpatient Medications   Medication Sig Dispense Refill     ADVAIR HFA 45-21 MCG/ACT inhaler        LORATADINE-D 24HR  MG per 24 hr tablet        VITAMIN D, CHOLECALCIFEROL, PO Take 3,000 Units by mouth daily       Social History     Tobacco Use     Smoking status: Never Smoker     Smokeless tobacco: Never Used   Substance Use Topics     Alcohol use: Yes     Comment: OCC       OBJECTIVE  /88   Pulse 84   Temp 97.4  F (36.3  C) (Tympanic)   Resp 16   Wt 69.9 kg (154 lb)   SpO2 98%   BMI 26.43 kg/m      Physical Exam  Constitutional:       General: She is not in acute distress.     Appearance: She is not diaphoretic.   HENT:      Head: Normocephalic and atraumatic.      Right Ear: External ear normal. There is impacted cerumen.      Left Ear: Tympanic membrane, ear canal and external ear normal.      Mouth/Throat:      Pharynx: No oropharyngeal exudate or posterior oropharyngeal erythema.      Tonsils: No tonsillar abscesses.   Eyes:      General:         Right eye: No discharge.         Left eye: No discharge.   Cardiovascular:      Rate and Rhythm: Normal rate and regular rhythm.      Heart sounds: Normal heart  sounds.   Pulmonary:      Effort: Pulmonary effort is normal. No respiratory distress.      Breath sounds: Normal breath sounds. No wheezing or rales.   Musculoskeletal:         General: Normal range of motion.      Cervical back: Normal range of motion and neck supple.   Lymphadenopathy:      Cervical: No cervical adenopathy.   Skin:     General: Skin is warm and dry.      Findings: No rash.   Neurological:      Mental Status: She is alert.      Cranial Nerves: No cranial nerve deficit.         Labs:ASSESSMENT:      ICD-10-CM    1. Impacted cerumen of right ear  H61.21         PLAN:  Cerumenosis is noted.  Wax is removed by syringing from the right ear. Instructions for home care to prevent wax buildup are given.  Sarai reports that she can hear much better from the right ear.  Tympanic membrane is intact.      Patient Instructions       Patient Education     Impacted Earwax     Inner ear structures including ear canal and eardrum.   Impacted earwax is a buildup of the natural wax in the ear. Impacted earwax is very common. It can cause symptoms such as hearing loss. It can also make it hard for a healthcare provider to check your ear.   Understanding earwax  Tiny glands in your ear make substances that combine with dead skin cells to form earwax. Earwax helps protect your ear canal from water, dirt, infection, and injury. Over time, earwax travels from the inner part of your ear canal to the entrance of the canal. Then it falls away naturally. But in some cases, it can t travel to the entrance of the canal. This may be because of a health condition or objects put in the ear. With age, earwax tends to become harder and less fluid. Older adults are more likely to have problems with earwax buildup.   What causes impacted earwax?  Earwax can build up because of many health conditions. Some cause a physical blockage. Others cause too much earwax to be made. Health conditions that can cause earwax buildup include:      Bony blockage in the ear (osteoma or exostoses)    Infections, such as an outer ear infection (external otitis)    Skin disease, such as eczema    Autoimmune diseases, such as lupus    A narrowed ear canal from birth, chronic inflammation, or injury    Too much earwax because of injury    Too much earwax because of  water in the ear canal  Putting objects in the ear again and again can also cause impacted earwax. For example, putting cotton swabs in the ear may push the wax deeper into the ear. Over time, this may cause blockage. Hearing aids, swimming plugs, and swim molds can also cause this problem when used again and again.   In some cases, the cause of impacted earwax is not known.  Symptoms of impacted earwax  Excess earwax often does not cause any symptoms, unless there is a large amount of buildup. Then it may cause symptoms such as:     Hearing loss    Earache    Sense of ear fullness    Itching in the ear    Odor from the ear    Ear drainage    Dizziness    Ringing in the ears    Cough  Treatment for impacted earwax  If you don t have symptoms, you may not need treatment. Often the earwax goes away on its own with time. If you have symptoms, you may have 1 or more treatments such as:     Ear drops to soften the earwax. This helps it leave the ear over time.    Rinsing the ear canal with water. This is done in a healthcare provider s office.    Removing the earwax with small tools. This is also done in a provider s office.  In rare cases, some treatments for earwax removal may cause complications such as:    Outer ear infection    Earache    Short-term hearing loss    Dizziness    Water trapped in the ear canal    Hole in the eardrum    Ringing in the ears    Bleeding from the ear  Talk with your healthcare provider about which risks apply most to you.  Healthcare providers don't advise using ear candles or ear vacuum kits. These methods are not shown to work and may cause problems.   Preventing  impacted earwax  You may not be able to prevent impacted earwax if you have a health condition that causes it, such as eczema. In other cases, you may be able to prevent earwax buildup by:     Using ear drops once a week    Having a regular ear cleaning about every 6 months    Not using cotton swabs in the ear  When to call the healthcare provider  Call your healthcare provider right away if you have:     Symptoms of impacted earwax    Severe symptoms after earwax removal, such as bleeding or severe ear pain    Loyda last reviewed this educational content on 9/1/2019 2000-2021 [employer]. All rights reserved. This information is not intended as a substitute for professional medical care. Always follow your healthcare professional's instructions.

## 2021-08-10 NOTE — PATIENT INSTRUCTIONS
Patient Education     Impacted Earwax     Inner ear structures including ear canal and eardrum.   Impacted earwax is a buildup of the natural wax in the ear. Impacted earwax is very common. It can cause symptoms such as hearing loss. It can also make it hard for a healthcare provider to check your ear.   Understanding earwax  Tiny glands in your ear make substances that combine with dead skin cells to form earwax. Earwax helps protect your ear canal from water, dirt, infection, and injury. Over time, earwax travels from the inner part of your ear canal to the entrance of the canal. Then it falls away naturally. But in some cases, it can t travel to the entrance of the canal. This may be because of a health condition or objects put in the ear. With age, earwax tends to become harder and less fluid. Older adults are more likely to have problems with earwax buildup.   What causes impacted earwax?  Earwax can build up because of many health conditions. Some cause a physical blockage. Others cause too much earwax to be made. Health conditions that can cause earwax buildup include:     Bony blockage in the ear (osteoma or exostoses)    Infections, such as an outer ear infection (external otitis)    Skin disease, such as eczema    Autoimmune diseases, such as lupus    A narrowed ear canal from birth, chronic inflammation, or injury    Too much earwax because of injury    Too much earwax because of  water in the ear canal  Putting objects in the ear again and again can also cause impacted earwax. For example, putting cotton swabs in the ear may push the wax deeper into the ear. Over time, this may cause blockage. Hearing aids, swimming plugs, and swim molds can also cause this problem when used again and again.   In some cases, the cause of impacted earwax is not known.  Symptoms of impacted earwax  Excess earwax often does not cause any symptoms, unless there is a large amount of buildup. Then it may cause symptoms such  as:     Hearing loss    Earache    Sense of ear fullness    Itching in the ear    Odor from the ear    Ear drainage    Dizziness    Ringing in the ears    Cough  Treatment for impacted earwax  If you don t have symptoms, you may not need treatment. Often the earwax goes away on its own with time. If you have symptoms, you may have 1 or more treatments such as:     Ear drops to soften the earwax. This helps it leave the ear over time.    Rinsing the ear canal with water. This is done in a healthcare provider s office.    Removing the earwax with small tools. This is also done in a provider s office.  In rare cases, some treatments for earwax removal may cause complications such as:    Outer ear infection    Earache    Short-term hearing loss    Dizziness    Water trapped in the ear canal    Hole in the eardrum    Ringing in the ears    Bleeding from the ear  Talk with your healthcare provider about which risks apply most to you.  Healthcare providers don't advise using ear candles or ear vacuum kits. These methods are not shown to work and may cause problems.   Preventing impacted earwax  You may not be able to prevent impacted earwax if you have a health condition that causes it, such as eczema. In other cases, you may be able to prevent earwax buildup by:     Using ear drops once a week    Having a regular ear cleaning about every 6 months    Not using cotton swabs in the ear  When to call the healthcare provider  Call your healthcare provider right away if you have:     Symptoms of impacted earwax    Severe symptoms after earwax removal, such as bleeding or severe ear pain    Loyda last reviewed this educational content on 9/1/2019 2000-2021 [employer]. All rights reserved. This information is not intended as a substitute for professional medical care. Always follow your healthcare professional's instructions.

## 2021-08-10 NOTE — NURSING NOTE
Patient identified using two patient identifiers.  Ear exam showing wax occlusion completed by provider.  Solution: warm water was placed in the right ear(s) via irrigation tool: elephant ear.      Christophe Vilchis, CMA

## 2021-10-18 NOTE — PATIENT INSTRUCTIONS
Dear Sarai Phelps,    Your symptoms show that you may have coronavirus (COVID-19). This illness can cause fever, cough and trouble breathing. Many people get a mild case and get better on their own. Some people can get very sick.    Because you also reported sore throat I would like to also test you for Strep Throat to determine if we need to treat you for that as well.    What should I do?  We would like to test you for Covid-19 virus and Strep Throat. I have placed orders for these tests.   To schedule: go to your [facility] home page and scroll down to the section that says  You have an appointment that needs to be scheduled  and click the large green button that says  Schedule Now  and follow the steps to find the next available openings.  It is important that when you are asked what the reason for your appointment is that you mention you need BOTH Covid and Strep tests.     If you are unable to complete these [facility] scheduling steps, please call [phone] to schedule your testing.     When it's time for your COVID and Strep test:  Stay at least 6 feet away from others. (If someone will drive you to your test, stay in the backseat, as far away from the  as you can.)  Cover your mouth and nose with a mask, tissue or washcloth.  Go straight to the testing site. Don't make any stops on the way there or back.    Starting now:     Do not go to work. Follow your usual processes for taking time away from work.   o If you receive a negative COVID-19 test result and were NOT exposed to someone with a known positive COVID-19 test, you can return to work once you're free of fever for 24 hours without fever-reducing medication and your symptoms are improving or resolved.  o If you receive a positive COVID-19 test result, return to work should be at least 10 days from symptom onset (20 days if people have immune compromise) and people should be fever-free for 24 hours without medications, and the respiratory  "symptoms should be improved significantly before returning to work or school  o If you were exposed to someone who has tested positive for COVID-19, you can return to work 14 days after your last contact with the positive individual, provided you do not have symptoms at all during that time.    During this time, don't leave the house except for testing or medical care.  o Stay in your own room, even for meals. Use your own bathroom if you can.  o Stay away from others in your home. No hugging, kissing or shaking hands. No visitors.  o Don't go to work, school or anywhere else.    Clean \"high touch\" surfaces often (doorknobs, counters, handles, etc.). Use a household cleaning spray or wipes. You'll find a full list of  on the EPA website: www.epa.gov/pesticide-registration/list-n-disinfectants-use-against-sars-cov-2.    Cover your mouth and nose with a mask, tissue or washcloth to avoid spreading germs.    Wash your hands and face often. Use soap and water.    People in these groups are at risk for severe illness due to COVID-19:  o People 65 years and older  o People who live in a nursing home or long-term care facility  o People with chronic disease (lung, heart, cancer, diabetes, kidney, liver, immunologic)  o People who have a weakened immune system, including those who:  - Are in cancer treatment  - Take medicine that weakens the immune system, such as corticosteroids  - Had a bone marrow or organ transplant  - Have an immune deficiency  - Have poorly controlled HIV or AIDS  - Are obese (body mass index of 40 or higher)  - Smoke regularly      Caregivers should wear gloves while washing dishes, handling laundry and cleaning bedrooms and bathrooms.    Use caution when washing and drying laundry: Don't shake dirty laundry, and use the warmest water setting that you can.    For more tips, go to www.cdc.gov/coronavirus/2019-ncov/downloads/10Things.pdf.    Sign up for Sania Perry. We know it's scary to hear " that you might have COVID-19. We want to track your symptoms to make sure you're okay over the next 2 weeks. Please look for an email from [employer]--this is a free, online program that we'll use to keep in touch. To sign up, follow the link in the email you will receive. Learn more at [email or web address]    How can I take care of myself?    Get lots of rest. Drink extra fluids (unless a doctor has told you not to)    Take Tylenol (acetaminophen) for fever or pain. If you have liver or kidney problems, ask your family doctor if it's okay to take Tylenol.  Adults can take either:    650 mg (two 325 mg pills) every 4 to 6 hours, or     1,000 mg (two 500 mg pills) every 8 hours as needed.    Note: Don't take more than 3,000 mg in one day. Acetaminophen is found in many medicines (both prescribed and over-the-counter medicines). Read all labels to be sure you don't take too much.  For children, check the Tylenol bottle for the right dose. The dose is based on the child's age or weight.    If you have other health problems (like cancer, heart failure, an organ transplant or severe kidney disease): Call your specialty clinic if you don't feel better in the next 2 days.    Know when to call 911. Emergency warning signs include:  Trouble breathing or shortness of breath  Pain or pressure in the chest that doesn't go away  Feeling confused like you haven't felt before, or not being able to wake up  Bluish-colored lips or face  Where can I get more information?    [facility] - About COVID-19: [email or web address]  CDC - What to Do If You're Sick: www.cdc.gov/coronavirus/2019-ncov/about/steps-when-sick.html  November 25, 2020    RE:  Sarai Phelps                                                                                                                                                       [address]  [place] [address]        To whom it may concern:    I evaluated Sarai  BALDO Phelps on November 25, 2020. Sarai Phelps should be excused from work/school.     Return to work should be at least 10 days from when symptoms first started (20 days if immunocompromised) and people should be fever-free for 24 hours without medications, and the respiratory symptoms should be improved significantly before returning to work or school.       Sincerely,  Newton Alamo PA-C             no

## 2021-12-07 ENCOUNTER — OFFICE VISIT (OUTPATIENT)
Dept: URGENT CARE | Facility: URGENT CARE | Age: 56
End: 2021-12-07
Payer: COMMERCIAL

## 2021-12-07 VITALS
BODY MASS INDEX: 25.75 KG/M2 | TEMPERATURE: 97.3 F | WEIGHT: 150 LBS | OXYGEN SATURATION: 99 % | HEART RATE: 71 BPM | DIASTOLIC BLOOD PRESSURE: 82 MMHG | SYSTOLIC BLOOD PRESSURE: 142 MMHG

## 2021-12-07 DIAGNOSIS — J22 LOWER RESPIRATORY INFECTION: Primary | ICD-10-CM

## 2021-12-07 DIAGNOSIS — J45.901 EXACERBATION OF ASTHMA, UNSPECIFIED ASTHMA SEVERITY, UNSPECIFIED WHETHER PERSISTENT: ICD-10-CM

## 2021-12-07 DIAGNOSIS — R05.9 COUGH: ICD-10-CM

## 2021-12-07 DIAGNOSIS — R03.0 ELEVATED BLOOD PRESSURE READING WITHOUT DIAGNOSIS OF HYPERTENSION: ICD-10-CM

## 2021-12-07 PROCEDURE — 99214 OFFICE O/P EST MOD 30 MIN: CPT | Performed by: NURSE PRACTITIONER

## 2021-12-07 PROCEDURE — U0003 INFECTIOUS AGENT DETECTION BY NUCLEIC ACID (DNA OR RNA); SEVERE ACUTE RESPIRATORY SYNDROME CORONAVIRUS 2 (SARS-COV-2) (CORONAVIRUS DISEASE [COVID-19]), AMPLIFIED PROBE TECHNIQUE, MAKING USE OF HIGH THROUGHPUT TECHNOLOGIES AS DESCRIBED BY CMS-2020-01-R: HCPCS | Performed by: NURSE PRACTITIONER

## 2021-12-07 PROCEDURE — U0005 INFEC AGEN DETEC AMPLI PROBE: HCPCS | Performed by: NURSE PRACTITIONER

## 2021-12-07 RX ORDER — AZITHROMYCIN 250 MG/1
TABLET, FILM COATED ORAL
Qty: 6 TABLET | Refills: 0 | Status: SHIPPED | OUTPATIENT
Start: 2021-12-07 | End: 2021-12-12

## 2021-12-07 RX ORDER — FLUTICASONE PROPIONATE 50 MCG
1 SPRAY, SUSPENSION (ML) NASAL DAILY
COMMUNITY

## 2021-12-07 RX ORDER — ALBUTEROL SULFATE 90 UG/1
2 AEROSOL, METERED RESPIRATORY (INHALATION) EVERY 6 HOURS
COMMUNITY

## 2021-12-07 RX ORDER — PREDNISONE 20 MG/1
40 TABLET ORAL DAILY
Qty: 10 TABLET | Refills: 0 | Status: SHIPPED | OUTPATIENT
Start: 2021-12-07 | End: 2021-12-12

## 2021-12-07 NOTE — LETTER
December 7, 2021      Sarai BLAND Shantdavid  [address]  [place] [address]        To Whom It May Concern:    Sarai BLAND Shantdavid  was seen on 12/7/21.  Please excuse her until symptoms improving for 24-hours and COVID test results available.        Sincerely,        ROMY Carrillo

## 2021-12-07 NOTE — PATIENT INSTRUCTIONS
"  Patient Education   After Your COVID-19 (Coronavirus) Test  You have been tested for COVID-19 (coronavirus).   If you'll have surgery in the next few days, we'll let you know ahead of time if you have the virus. Please call your surgeon's office with any questions.  For all other patients: Results are usually available in [employer] within 2 to 3 days.   If you do not have a [employer] account, you'll get a letter in the mail in about 7 to 10 days.   [facility] is often the fastest way to get test results. Please sign up if you do not already have a [employer] account. See the handout Getting COVID-19 Test Results in [employer] for help.  What if my test result is positive?  If your test is positive and you have not viewed your result in [employer], you'll get a phone call with your result. (A positive test means that you have the virus.)     Follow the tips under \"How do I self-isolate?\" below for 10 days (20 days if you have a weak immune system).    You don't need to be retested for COVID-19 before going back to school or work. As long as you're fever-free and feeling better, you can go back to school, work and other activities after waiting the 10 or 20 days.  What if I have questions after I get my results?  If you have questions about your results, please visit our testing website at [email or web address].   After 7 to 10 days, if you have not gotten your results:     Call [phone] ([phone]) and ask to speak with our COVID-19 results team.    If you're being treated at an infusion center: Call your infusion center directly.  What are the symptoms of COVID-19?  Cough, fever and trouble breathing are the most common signs of COVID-19.  Other symptoms can include new headaches, new muscle or body aches, new and unexplained fatigue (feeling very tired), chills, sore throat, congestion (stuffy or runny nose), diarrhea (loose poop), loss of taste or smell, belly pain, and nausea or vomiting " "(feeling sick to your stomach or throwing up).  You may already have symptoms of COVID-19, or they may show up later.  What should I do if I have symptoms?  If you're having surgery: Call your surgeon's office.  For all other patients: Stay home and away from others (self-isolate) until ...    You've had no fever--and no medicine that reduces fever--for 1 full day (24 hours), AND    Other symptoms have gotten better. For example, your cough or breathing has improved, AND    At least 10 days have passed since your symptoms first started.  How do I self-isolate?    Stay in your own room, even for meals. Use your own bathroom if you can.    Stay away from others in your home. No hugging, kissing or shaking hands. No visitors.    Don't go to work, school or anywhere else.    Clean \"high touch\" surfaces often (doorknobs, counters, handles). Use household cleaning spray or wipes. You'll find a full list of  on the EPA website: www.epa.gov/pesticide-registration/list-n-disinfectants-use-against-sars-cov-2.    Cover your mouth and nose with a mask or other face covering to avoid spreading germs.    Wash your hands and face often. Use soap and water.    Caregivers in these groups are at risk for severe illness due to COVID-19:  ? People 65 years and older  ? People who live in a nursing home or long-term care facility  ? People with chronic disease (lung, heart, cancer, diabetes, kidney, liver, immunologic)  ? People who have a weakened immune system, including those who:    Are in cancer treatment    Take medicine that weakens the immune system, such as corticosteroids    Had a bone marrow or organ transplant    Have an immune deficiency    Have poorly controlled HIV or AIDS    Are obese (body mass index of 40 or higher)    Smoke regularly    Caregivers should wear gloves while washing dishes, handling laundry and cleaning bedrooms and bathrooms.    Use caution when washing and drying laundry: Don't shake dirty " laundry and use the warmest water setting that you can.    For more tips on managing your health at home, go to www.cdc.gov/coronavirus/2019-ncov/downloads/10Things.pdf.  How can I take care of myself at home?  1. Get lots of rest. Drink extra fluids (unless a doctor has told you not to).  2. Take Tylenol (acetaminophen) for fever or pain. If you have liver or kidney problems, ask your family doctor if it's OK to take Tylenol.   Adults can take either:  ? 650 mg (two 325 mg pills) every 4 to 6 hours, or   ? 1,000 mg (two 500 mg pills) every 8 hours as needed.  ? Note: Don't take more than 3,000 mg in one day. Acetaminophen is found in many medicines (both prescribed and over-the-counter medicines). Read all labels to be sure you don't take too much.   For children, check the Tylenol bottle for the right dose. The dose is based on the child's age or weight.  3. If you have other health problems (like cancer, heart failure, an organ transplant or severe kidney disease): Call your specialty clinic if you don't feel better in the next 2 days.  4. Know when to call 911. Emergency warning signs include:  ? Trouble breathing or shortness of breath  ? Chest pain or pressure that doesn't go away  ? Feeling confused like you haven't felt before, or not being able to wake up  ? Bluish-colored lips or face  5. If your doctor prescribed a blood thinner medicine: Follow their instructions.  Where can I get more information?    [facility] - About COVID-19:   [email or web address]    CDC - If You're Sick: cdc.gov/coronavirus/2019-ncov/about/steps-when-sick.html    CDC - Ending Home Isolation: www.cdc.gov/coronavirus/2019-ncov/hcp/disposition-in-home-patients.html    CDC - Caring for Someone: www.cdc.gov/coronavirus/2019-ncov/if-you-are-sick/care-for-someone.html    [facility] - Interim Guidance for Hospital Discharge to Home: www.health.[facility].[place]/diseases/coronavirus/hcp/hospdischarge.pdf    [facility]  clinical trials (COVID-19 research studies): clinicalaffairs.[facility]-clinical-trials    Below are the COVID-19 hotlines at the [place] Department of Health ([facility]). Interpreters are available.  ? For health questions: Call 994-353-3830 or 1-555.442.8825 (7 a.m. to 7 p.m.)  ? For questions about schools and childcare: Call 806-858-4883 or 1-808.796.8091 (7 a.m. to 7 p.m.)    For informational purposes only. Not to replace the advice of your health care provider. Clinically reviewed by Infection Prevention and the [facility] COVID-19 Clinical Team. Copyright   2020 [facility]. All rights reserved. [employer] 969918 - Rev 11/11/20.

## 2021-12-07 NOTE — PROGRESS NOTES
Assessment & Plan     Lower respiratory infection    - azithromycin (ZITHROMAX) 250 MG tablet  Dispense: 6 tablet; Refill: 0    Cough    - Symptomatic COVID-19 Virus (Coronavirus) by PCR Nose    Exacerbation of asthma, unspecified asthma severity, unspecified whether persistent    - predniSONE (DELTASONE) 20 MG tablet  Dispense: 10 tablet; Refill: 0    Elevated blood pressure reading without diagnosis of hypertension       Prescription sent to pharmacy for azithromycin daily for 5 days for lower respiratory tract infection exacerbated by asthma. Prescription sent to pharmacy for prednisone daily for 5 days, potential side effects discussed. Chest xray not indicated. Rest, fluids, tylenol, Mucinex, Robitussin or Delsym as needed, flonase, neti pot. Self-quarantine recommended, letter given for work. COVID testing in process.     Patient is hypertensive today but asymptomatic.  No headache, blurring of vision, chest pain, shortness of breath, dizziness, numbness, or weakness. Second BP reading was also above goal.  Patient instructed to follow up with PCP within the next week for BP recheck.  Instructed to go to emergency department if develops chest pain, shortness of breath, dizziness, vision changes, numbness, weakness.       Follow-up with PCP if symptoms persist for 7 days, and sooner if symptoms worsen or new symptoms develop.     Discussed red flag symptoms which warrant immediate visit in emergency room    All questions were answered and patient verbalized understanding. AVS reviewed with patient.     Lorena Tellez, DNP, APRN, CNP 12/7/2021 10:51 AM  [facility]          Brian Moon is a 56 year old female who presents to clinic today for the following health issues:  Chief Complaint   Patient presents with     Cough     Daughter had RSV before thanksgiving. Started Nov 23rd with cough, chest congestion, runny nose. Used OTC not working.     Patient presents for evaluation  of cough. She has had a cough for the past 2 weeks which has been worsening. Associated symptoms: wheezing, chest congestion, runny nose, fatigue, mild shortness of breath, sinus drainage, plugged ears. She has tried OTC medications which haven't been helping including Mucinex. Her daughter had RSV before Thanksgiving. Denies fever, chills, sinus pain/pressure. She has been using her albuterol inhaler every 6 hours which helps temporarily. She has been vaccinated for COVID. She has a history of asthma. No history of pneumonia or tobacco use.     Problem list, Medication list, Allergies, and Medical history reviewed in EPIC.    ROS:  Review of systems negative except for noted above        Objective    BP (!) 142/82   Pulse 71   Temp 97.3  F (36.3  C) (Tympanic)   Wt 68 kg (150 lb)   LMP 10/10/2020 (Approximate)   SpO2 99%   BMI 25.75 kg/m    Physical Exam  Constitutional:       General: She is not in acute distress.     Appearance: She is not toxic-appearing or diaphoretic.   HENT:      Head: Normocephalic and atraumatic.      Right Ear: Tympanic membrane, ear canal and external ear normal.      Left Ear: Tympanic membrane, ear canal and external ear normal.      Nose:      Right Sinus: No maxillary sinus tenderness or frontal sinus tenderness.      Left Sinus: No maxillary sinus tenderness or frontal sinus tenderness.      Comments: Moderate nasal congestion     Mouth/Throat:      Mouth: Mucous membranes are moist.      Pharynx: Oropharynx is clear. No oropharyngeal exudate or posterior oropharyngeal erythema.      Tonsils: No tonsillar abscesses. 0 on the right. 0 on the left.   Eyes:      Conjunctiva/sclera: Conjunctivae normal.   Cardiovascular:      Rate and Rhythm: Normal rate and regular rhythm.      Heart sounds: Normal heart sounds.   Pulmonary:      Effort: Pulmonary effort is normal. No respiratory distress.      Breath sounds: Wheezing present. No rhonchi or rales.      Comments: Able to speak in  full sentences, wheezing throughout lungs  Lymphadenopathy:      Cervical: No cervical adenopathy.   Skin:     General: Skin is warm and dry.   Neurological:      Mental Status: She is alert.

## 2021-12-08 LAB — SARS-COV-2 RNA RESP QL NAA+PROBE: NEGATIVE

## 2022-07-22 ENCOUNTER — ANCILLARY PROCEDURE (OUTPATIENT)
Dept: MAMMOGRAPHY | Facility: CLINIC | Age: 57
End: 2022-07-22
Payer: COMMERCIAL

## 2022-07-22 ENCOUNTER — IMMUNIZATION (OUTPATIENT)
Dept: NURSING | Facility: CLINIC | Age: 57
End: 2022-07-22
Payer: COMMERCIAL

## 2022-07-22 DIAGNOSIS — Z12.31 VISIT FOR SCREENING MAMMOGRAM: ICD-10-CM

## 2022-07-22 PROCEDURE — 91306 COVID-19,PF,MODERNA (18+ YRS BOOSTER .25ML): CPT

## 2022-07-22 PROCEDURE — 77063 BREAST TOMOSYNTHESIS BI: CPT | Mod: TC | Performed by: RADIOLOGY

## 2022-07-22 PROCEDURE — 0064A COVID-19,PF,MODERNA (18+ YRS BOOSTER .25ML): CPT

## 2022-07-22 PROCEDURE — 77067 SCR MAMMO BI INCL CAD: CPT | Mod: TC | Performed by: RADIOLOGY

## 2023-02-14 NOTE — PROGRESS NOTES
[facility] Dermatology Note  Encounter Date: Feb 15, 2023  Office Visit     Dermatology Problem List:  1. Neoplasm of uncertain behavior x2  - L upper cutaneous lip, melanocytic nevus vs other, bx 2/15/23  - Upper paraspinal back, BCC vs other, bx 2/15/23  2. Dermatofibroma, R upper arm    ____________________________________________    Assessment & Plan:    # Neoplasm of uncertain behavior x2  - L upper cutaneous lip. Appears melanocytic, no concerning features dermatoscopically, though relatively new spot, warrants biopsy. Melanocytic nevus vs other. Punch biopsy today, procedure note below.   - Upper paraspinal back, BCC vs other, shave biopsy today, procedure note below.     # Seborrheic keratoses, cherry angiomas, multiple benign nevi, lentigines, dermatofibroma  - Reassured benign etiology.  - No treatment required today.   - Recommended diligent sun protection with SPF 30 or higher. Handout provided.  - Discussed signs and symptoms of skin cancers and ABCDEs of melanoma.        Procedures Performed:   - Shave biopsy procedure note, location(s): upper paraspinal back. After discussion of benefits and risks including but not limited to bleeding, infection, scar, incomplete removal, recurrence, and non-diagnostic biopsy, written consent and photographs were obtained. The area was cleaned with isopropyl alcohol. 0.5mL of 1% lidocaine with epinephrine was injected to obtain adequate anesthesia of lesion(s). Shave biopsy at site(s) performed. Hemostasis was achieved with aluminium chloride. Petrolatum ointment and a sterile dressing were applied. The patient tolerated the procedure and no complications were noted. The patient was provided with verbal and written post care instructions.     - Punch biopsy procedure note, location(s): L upper cutaneous lip. After discussion of benefits and risks including but not limited to bleeding, infection, scar, incomplete removal, recurrence, and  non-diagnostic biopsy, written consent and photographs were obtained. The area was cleaned with isopropyl alcohol. 0.5mL of 1% lidocaine with epinephrine was injected to obtain adequate anesthesia and a 4 mm punch biopsy was performed at site(s). 5-0 Prolene sutures were utilized to approximate the epidermal edges. White petrolatum ointment and a bandage was applied to the wound. Explicit verbal and written wound care instructions were provided. The patient left the dermatology clinic in good condition.     Follow-up: One year, or sooner pending biopsy results.    Staff and Scribe:     Scribe Disclosure:  I, aRshi Peters, am serving as a scribe to document services personally performed by Ember Eagle MD based on data collection and the provider's statements to me.     Resident:  I saw and discussed the patient with the attending physician, Dr. Fco Calixto MD, PhD  PGY-2 Dermatology Resident       Staff Physician Comments:   I saw and evaluated the patient with the resident and I agree with the assessment and plan.  I was present for the key portions of the above major procedure and examination.    Ember Eagle MD    Department of Dermatology  [facility]: Phone: [phone], Fax: [phone]  2/20/2023   ____________________________________________    CC: Skin Check (Dark spot on face - full body skin check)    HPI:  Ms. Sarai Phelps is a(n) 57 year old female who presents today as a new patient for skin check.    - Would like full body skin check today.   - One spot of concern on L upper cutaneous lip, dark brown/black spot present 1-2 years, maybe slightly larger than a year ago though not significantly changing. Not symptomatic, not bleeding.  - Otherwise no lesions of concern, wears sunscreen regularly.  - No history of tanning bed use.  - Does report history of blistering sun burns  in youth.  - No family history of melanoma.      Patient is otherwise feeling well, without additional skin concerns.    Labs Reviewed:  N/A    Physical Exam:  Vitals: LMP 10/10/2020 (Approximate)   SKIN: Full body skin exam excluding the genitals was performed including face, scalp, neck, ears, chest, back, bilateral arms, hands, bilateral legs, feet, and buttocks.   - On the L upper cutaneous lip there is a hazy dark brown, almost black, 2-3mm papule.   - On the upper paraspinal back there is a 3-4mm shiny pink papule.   - On the R upper arm there is pink to red scar-like papule that dimples with lateral pressure and has central white area on dermoscopy.   - There are dome shaped bright red papules on the trunk and extremities.   - There are waxy stuck on tan to brown papules sparsely on the trunk.   - Multiple regular brown pigmented macules and papules are identified sparsely on the trunk and extremities.   - No other lesions of concern on areas examined.             Medications:  Current Outpatient Medications   Medication     ADVAIR HFA 45-21 MCG/ACT inhaler     albuterol (PROAIR HFA/PROVENTIL HFA/VENTOLIN HFA) 108 (90 Base) MCG/ACT inhaler     fluticasone (FLONASE) 50 MCG/ACT nasal spray     LORATADINE-D 24HR  MG per 24 hr tablet     Misc Natural Products (RELIZEN) TABS     Misc Natural Products (RISTELA) TABS     Turmeric 500 MG CAPS     Vaginal Lubricant (REVAREE) SUPP     VITAMIN D, CHOLECALCIFEROL, PO     No current facility-administered medications for this visit.      Past Medical History:   Patient Active Problem List   Diagnosis     Mild persistent asthma without complication     Vitamin D deficiency     Cervical high risk HPV (human papillomavirus) test positive     Pes planus of both feet     Chondromalacia of right patella     Advanced directives, counseling/discussion     Past Medical History:   Diagnosis Date     Cervical high risk HPV (human papillomavirus) test positive 11/05/2018    see  problem list        CC Referred Self, MD  No address on file on close of this encounter.

## 2023-02-15 ENCOUNTER — OFFICE VISIT (OUTPATIENT)
Dept: DERMATOLOGY | Facility: CLINIC | Age: 58
End: 2023-02-15
Payer: COMMERCIAL

## 2023-02-15 DIAGNOSIS — D23.9 DERMATOFIBROMA: ICD-10-CM

## 2023-02-15 DIAGNOSIS — D49.2 NEOPLASM OF SKIN: Primary | ICD-10-CM

## 2023-02-15 DIAGNOSIS — D18.01 CHERRY ANGIOMA: ICD-10-CM

## 2023-02-15 DIAGNOSIS — D22.9 MULTIPLE BENIGN NEVI: ICD-10-CM

## 2023-02-15 DIAGNOSIS — L82.1 SEBORRHEIC KERATOSES: ICD-10-CM

## 2023-02-15 DIAGNOSIS — L81.4 LENTIGINES: ICD-10-CM

## 2023-02-15 PROCEDURE — 88341 IMHCHEM/IMCYTCHM EA ADD ANTB: CPT | Mod: 26 | Performed by: DERMATOLOGY

## 2023-02-15 PROCEDURE — 11104 PUNCH BX SKIN SINGLE LESION: CPT | Mod: GC | Performed by: DERMATOLOGY

## 2023-02-15 PROCEDURE — 99203 OFFICE O/P NEW LOW 30 MIN: CPT | Mod: 25 | Performed by: DERMATOLOGY

## 2023-02-15 PROCEDURE — 88342 IMHCHEM/IMCYTCHM 1ST ANTB: CPT | Mod: 26 | Performed by: DERMATOLOGY

## 2023-02-15 PROCEDURE — 11103 TANGNTL BX SKIN EA SEP/ADDL: CPT | Mod: GC | Performed by: DERMATOLOGY

## 2023-02-15 PROCEDURE — 88305 TISSUE EXAM BY PATHOLOGIST: CPT | Mod: 26 | Performed by: DERMATOLOGY

## 2023-02-15 PROCEDURE — 88341 IMHCHEM/IMCYTCHM EA ADD ANTB: CPT | Mod: TC | Performed by: DERMATOLOGY

## 2023-02-15 RX ORDER — VIT C/B6/B5/MAGNESIUM/HERB 173 50-5-6-5MG
CAPSULE ORAL
COMMUNITY

## 2023-02-15 RX ORDER — CITRULL/ARGIN/PINE XT/ROSE HIP 400-400 MG
TABLET ORAL
COMMUNITY

## 2023-02-15 NOTE — LETTER
2/15/2023       RE: Sarai Hardinshonciara  [address] [place] [address]     Dear Colleague,    Thank you for referring your patient, Sarai Phelps, to the [facility] DERMATOLOGY CLINIC [place] at [facility]. Please see a copy of my visit note below.    [facility] Dermatology Note  Encounter Date: Feb 15, 2023  Office Visit     Dermatology Problem List:  1. Neoplasm of uncertain behavior x2  - L upper cutaneous lip, melanocytic nevus vs other, bx 2/15/23  - Upper paraspinal back, BCC vs other, bx 2/15/23  2. Dermatofibroma, R upper arm    ____________________________________________    Assessment & Plan:    # Neoplasm of uncertain behavior x2  - L upper cutaneous lip. Appears melanocytic, no concerning features dermatoscopically, though relatively new spot, warrants biopsy. Melanocytic nevus vs other. Punch biopsy today, procedure note below.   - Upper paraspinal back, BCC vs other, shave biopsy today, procedure note below.     # Seborrheic keratoses, cherry angiomas, multiple benign nevi, lentigines, dermatofibroma  - Reassured benign etiology.  - No treatment required today.   - Recommended diligent sun protection with SPF 30 or higher. Handout provided.  - Discussed signs and symptoms of skin cancers and ABCDEs of melanoma.        Procedures Performed:   - Shave biopsy procedure note, location(s): upper paraspinal back. After discussion of benefits and risks including but not limited to bleeding, infection, scar, incomplete removal, recurrence, and non-diagnostic biopsy, written consent and photographs were obtained. The area was cleaned with isopropyl alcohol. 0.5mL of 1% lidocaine with epinephrine was injected to obtain adequate anesthesia of lesion(s). Shave biopsy at site(s) performed. Hemostasis was achieved with aluminium chloride. Petrolatum ointment and a sterile dressing were applied. The patient tolerated the  procedure and no complications were noted. The patient was provided with verbal and written post care instructions.     - Punch biopsy procedure note, location(s): L upper cutaneous lip. After discussion of benefits and risks including but not limited to bleeding, infection, scar, incomplete removal, recurrence, and non-diagnostic biopsy, written consent and photographs were obtained. The area was cleaned with isopropyl alcohol. 0.5mL of 1% lidocaine with epinephrine was injected to obtain adequate anesthesia and a 4 mm punch biopsy was performed at site(s). 5-0 Prolene sutures were utilized to approximate the epidermal edges. White petrolatum ointment and a bandage was applied to the wound. Explicit verbal and written wound care instructions were provided. The patient left the dermatology clinic in good condition.     Follow-up: One year, or sooner pending biopsy results.    Staff and Scribe:     Scribe Disclosure:  I, Rashi Peters, am serving as a scribe to document services personally performed by Ember Eagle MD based on data collection and the provider's statements to me.     Resident:  I saw and discussed the patient with the attending physician, Dr. Fco Calixto MD, PhD  PGY-2 Dermatology Resident       Staff Physician Comments:   I saw and evaluated the patient with the resident and I agree with the assessment and plan.  I was present for the key portions of the above major procedure and examination.    Ember Eagle MD    Department of Dermatology  [facility]: Phone: [phone], Fax: [phone]  2/20/2023   ____________________________________________    CC: Skin Check (Dark spot on face - full body skin check)    HPI:  Ms. Sarai Phelps is a(n) 57 year old female who presents today as a new patient for skin check.    - Would like full body skin check today.   - One spot of  concern on L upper cutaneous lip, dark brown/black spot present 1-2 years, maybe slightly larger than a year ago though not significantly changing. Not symptomatic, not bleeding.  - Otherwise no lesions of concern, wears sunscreen regularly.  - No history of tanning bed use.  - Does report history of blistering sun burns in youth.  - No family history of melanoma.      Patient is otherwise feeling well, without additional skin concerns.    Labs Reviewed:  N/A    Physical Exam:  Vitals: LMP 10/10/2020 (Approximate)   SKIN: Full body skin exam excluding the genitals was performed including face, scalp, neck, ears, chest, back, bilateral arms, hands, bilateral legs, feet, and buttocks.   - On the L upper cutaneous lip there is a hazy dark brown, almost black, 2-3mm papule.   - On the upper paraspinal back there is a 3-4mm shiny pink papule.   - On the R upper arm there is pink to red scar-like papule that dimples with lateral pressure and has central white area on dermoscopy.   - There are dome shaped bright red papules on the trunk and extremities.   - There are waxy stuck on tan to brown papules sparsely on the trunk.   - Multiple regular brown pigmented macules and papules are identified sparsely on the trunk and extremities.   - No other lesions of concern on areas examined.             Medications:  Current Outpatient Medications   Medication     ADVAIR HFA 45-21 MCG/ACT inhaler     albuterol (PROAIR HFA/PROVENTIL HFA/VENTOLIN HFA) 108 (90 Base) MCG/ACT inhaler     fluticasone (FLONASE) 50 MCG/ACT nasal spray     LORATADINE-D 24HR  MG per 24 hr tablet     Finomialc Natural Products (RELIZEN) TABS     Misc Natural Products (RISTELA) TABS     Turmeric 500 MG CAPS     Vaginal Lubricant (REVAREE) SUPP     VITAMIN D, CHOLECALCIFEROL, PO     No current facility-administered medications for this visit.      Past Medical History:   Patient Active Problem List   Diagnosis     Mild persistent asthma without complication      Vitamin D deficiency     Cervical high risk HPV (human papillomavirus) test positive     Pes planus of both feet     Chondromalacia of right patella     Advanced directives, counseling/discussion     Past Medical History:   Diagnosis Date     Cervical high risk HPV (human papillomavirus) test positive 11/05/2018    see problem list        CC Referred Self, MD  No address on file on close of this encounter.

## 2023-02-15 NOTE — NURSING NOTE
Dermatology Rooming Note    Sarai Phelps's goals for this visit include:   Chief Complaint   Patient presents with     Skin Check     Dark spot on face - full body skin check     Pascale Gomez CMA

## 2023-02-15 NOTE — PATIENT INSTRUCTIONS
Patient Education     Checking for Skin Cancer  You can find cancer early by checking your skin each month. There are 3 kinds of skin cancer. They are melanoma, basal cell carcinoma, and squamous cell carcinoma. Doing monthly skin checks is the best way to find new marks or skin changes. Follow the instructions below for checking your skin.   The ABCDEs of checking moles for melanoma   Check your moles or growths for signs of melanoma using ABCDE:   Asymmetry: the sides of the mole or growth don t match  Border: the edges are ragged, notched, or blurred  Color: the color within the mole or growth varies  Diameter: the mole or growth is larger than 6 mm (size of a pencil eraser)  Evolving: the size, shape, or color of the mole or growth is changing (evolving is not shown in the images below)    Checking for other types of skin cancer  Basal cell carcinoma or squamous cell carcinoma have symptoms such as:     A spot or mole that looks different from all other marks on your skin  Changes in how an area feels, such as itching, tenderness, or pain  Changes in the skin's surface, such as oozing, bleeding, or scaliness  A sore that does not heal  New swelling or redness beyond the border of a mole    Who s at risk?  Anyone can get skin cancer. But you are at greater risk if you have:   Fair skin, light-colored hair, or light-colored eyes  Many moles or abnormal moles on your skin  A history of sunburns from sunlight or tanning beds  A family history of skin cancer  A history of exposure to radiation or chemicals  A weakened immune system  If you have had skin cancer in the past, you are at risk for recurring skin cancer.   How to check your skin  Do your monthly skin checkups in front of a full-length mirror. Check all parts of your body, including your:   Head (ears, face, neck, and scalp)  Torso (front, back, and sides)  Arms (tops, undersides, upper, and lower armpits)  Hands (palms, backs, and fingers, including  under the nails)  Buttocks and genitals  Legs (front, back, and sides)  Feet (tops, soles, toes, including under the nails, and between toes)  If you have a lot of moles, take digital photos of them each month. Make sure to take photos both up close and from a distance. These can help you see if any moles change over time.   Most skin changes are not cancer. But if you see any changes in your skin, call your doctor right away. Only he or she can diagnose a problem. If you have skin cancer, seeing your doctor can be the first step toward getting the treatment that could save your life.   [employer] last reviewed this educational content on 4/1/2019 2000-2020 [employer]. [address] [place] [address], [place] [address]. All rights reserved. This information is not intended as a substitute for professional medical care. Always follow your healthcare professional's instructions.       When should I call my doctor?  If you are worsening or not improving, please, contact us or seek urgent care as noted below.     Who should I call with questions (adults)?  [facility] (adult and pediatric): [phone]  [facility] (adult): [phone]  For urgent needs outside of business hours call the [facility] at [phone] and ask for the dermatology resident on call to be paged  If this is a medical emergency and you are unable to reach an ER, Call [phone]    Who should I call with questions (pediatric)?  [facility]- Pediatric Dermatology  Dr. Sun Corcoran, Dr. Willa Duckworth, Dr. Radha Dill, DEION Desouza, Dr. Dagmar Anthony, Dr. Lisy Lawrence & Dr. Sammy Rodriguez  Non-urgent nurse triage line; [phone]- Latonia and Kalyani SPIVEY Care Coordinatorbj Magaña (/Complex ) [phone]    If you need a prescription refill, please contact your pharmacy. Refills are approved or denied by our  Physicians during normal business hours, Monday through Fridays  Per office policy, refills will not be granted if you have not been seen within the past year (or sooner depending on your child's condition)    Scheduling Information:  Pediatric Appointment Scheduling and Call Center [phone]  Radiology Scheduling- [phone]  Sedation Unit Scheduling- [phone]  [place] Scheduling- General [phone]; Pediatric Dermatology [phone]  Main  Services: [phone]  Occitan: [phone]  Taiwanese: [phone]  Hmong/Mosotho/Serbian: [phone]  Preadmission Nursing Department Fax Number: [phone] (Fax all pre-operative paperwork to this number)    For urgent matters arising during evenings, weekends, or holidays that cannot wait for normal business hours please call [phone] and ask for the dermatology resident on call to be paged.      Wound Care After a Biopsy    What is a skin biopsy?  A skin biopsy allows the doctor to examine a very small piece of tissue under the microscope to determine the diagnosis and the best treatment for the skin condition. A local anesthetic (numbing medicine)  is injected with a very small needle into the skin area to be tested. A small piece of skin is taken from the area. Sometimes a suture (stitch) is used.     What are the risks of a skin biopsy?  I will experience scar, bleeding, swelling, pain, crusting and redness. I may experience incomplete removal or recurrence. Risks of this procedure are excessive bleeding, bruising, infection, nerve damage, numbness, thick (hypertrophic or keloidal) scar and non-diagnostic biopsy.    How should I care for my wound for the first 24 hours?  Keep the wound dry and covered for 24 hours  If it bleeds, hold direct pressure on the area for 15 minutes. If bleeding does not stop then go to the emergency room  Avoid strenuous exercise the first 1-2 days or as your doctor instructs you    How should I care for the  wound after 24 hours?  After 24 hours, remove the bandage  You may bathe or shower as normal  If you had a scalp biopsy, you can shampoo as usual and can use shower water to clean the biopsy site daily  Clean the wound twice a day with gentle soap and water  Do not scrub, be gentle  Apply white petroleum/Vaseline after cleaning the wound with a cotton swab or a clean finger, and keep the site covered with a Bandaid /bandage. Bandages are not necessary with a scalp biopsy  If you are unable to cover the site with a Bandaid /bandage, re-apply ointment 2-3 times a day to keep the site moist. Moisture will help with healing  Avoid strenuous activity for first 1-2 days  Avoid lakes, rivers, pools, and oceans until the stitches are removed or the site is healed    How do I clean my wound?  Wash hands thoroughly with soap or use hand  before all wound care  Clean the wound with gentle soap and water  Apply white petroleum/Vaseline  to wound after it is clean  Replace the Bandaid /bandage to keep the wound covered for the first few days or as instructed by your doctor  If you had a scalp biopsy, warm shower water to the area on a daily basis should suffice    What should I use to clean my wound?   Cotton-tipped applicators (Qtips )  White petroleum jelly (Vaseline ). Use a clean new container and use Q-tips to apply.  Bandaids   as needed  Gentle soap     How should I care for my wound long term?  Do not get your wound dirty  Keep up with wound care for one week or until the area is healed.  A small scab will form and fall off by itself when the area is completely healed. The area will be red and will become pink in color as it heals. Sun protection is very important for how your scar will turn out. Sunscreen with an SPF 30 or greater is recommended once the area is healed.  If you have stitches, stitches need to be removed in 7 days. You may return to our clinic for this or you may have it done locally at your  doctor s office.  You should have some soreness but it should be mild and slowly go away over several days. Talk to your doctor about using tylenol for pain,    When should I call my doctor?  If you have increased:   Pain or swelling  Pus or drainage (clear or slightly yellow drainage is ok)  Temperature over 100F  Spreading redness or warmth around wound    When will I hear about my results?  The biopsy results can take 2 weeks to come back.  Your results will automatically release to [employer] before your provider has even reviewed them.  The clinic will call you with the results, send you a Danger message, or have you schedule a follow-up clinic or phone time to discuss the results.  Contact our clinics if you do not hear from us in 2 weeks.    Who should I call with questions?  [facility]: [phone]  [facility]: [phone]  For urgent needs outside of business hours call the [facility] at [phone] and ask for the dermatology resident on call

## 2023-02-15 NOTE — NURSING NOTE
Lidocaine-epinephrine 1-1:958133 % injection   3mL once for one use, starting 2/15/2023 ending 2/15/2023,  2mL disp, R-0, injection  Injected by Dr. Calixto

## 2023-02-17 LAB
PATH REPORT.COMMENTS IMP SPEC: NORMAL
PATH REPORT.FINAL DX SPEC: NORMAL
PATH REPORT.GROSS SPEC: NORMAL
PATH REPORT.MICROSCOPIC SPEC OTHER STN: NORMAL
PATH REPORT.RELEVANT HX SPEC: NORMAL

## 2023-04-15 ENCOUNTER — HEALTH MAINTENANCE LETTER (OUTPATIENT)
Age: 58
End: 2023-04-15

## 2023-05-16 NOTE — PROGRESS NOTES
[facility] Dermatology Note  Encounter Date: May 17, 2023  Office Visit     Dermatology Problem List:  1. Benign biopsies   - Deep penetrating nevus, L upper cutaneous lip, s/p bx 2/15/23; re-excise if pigment recurrs  - BLK, upper paraspinal back, s/p bx 2/15/23  2. Dermatofibroma, R upper arm    ____________________________________________    Assessment & Plan:    # Deep penetrating nevus, L upper cutaneous lip, s/p bx 2/15/23  Today is well-healed without evidence of pigment recurrence. Per path report, they recommended re-excision if any pigment recurrence. Counseled that if she notices any pigment recurrence to take a photo and send via [employer] and we would refer her to Derm surgery team.  - Continue to monitor for recurrence       Procedures Performed:   None    Follow-up: 1 year(s) in-person, or earlier for new or changing lesions    Staff Medical Student and Scribe:     Scribe Disclosure:  I, Paul Lugo, am serving as a scribe to document services personally performed by Ember Eagle MD based on data collection and the provider's statements to me.    Seen and Staffed with Dr. Ember Long, MS4  [facility] [employer]    Staff Physician:  I was present with the medical student who participated in the service and in the documentation of the note. I have verified the history and personally performed the physical exam and medical decision making. I agree with the assessment and plan of care as documented in the note.         Ember Eagle MD    Department of Dermatology  [facility]: Phone: [phone], Fax: [phone]  5/18/2023    ____________________________________________    CC: Derm Problem (3month follow up.  Has no knew areas of concern )    HPI:  Ms. Sarai Bob is a(n) 57 year old female who presents today as a return patient for  follow-up on left upper cutaneous deep penetrating nevus . Last seen by me on 2/15/2023, at which time patient underwent biopsy x2 of NUBs on the left upper cutaneous lip which returned as deep penetrating nevus and on the upper paraspinal back which returned as BLK.    Today, she has no new concerns. Spot on the upper lip has healed up nicely, has had no changes in the last 3 months. Was able to take stitches out without any complications. No other lesions of concern today.    Patient is otherwise feeling well, without additional skin concerns.    Labs Reviewed:  N/A    Physical Exam:  Vitals: LMP 10/10/2020 (Approximate)   SKIN: Focused examination of face was performed.  - Well-healed scar in area of left upper cutaneous lip without recurrence of pigmentation.  - No other lesions of concern on areas examined.     Medications:  Current Outpatient Medications   Medication     ADVAIR HFA 45-21 MCG/ACT inhaler     albuterol (PROAIR HFA/PROVENTIL HFA/VENTOLIN HFA) 108 (90 Base) MCG/ACT inhaler     fluticasone (FLONASE) 50 MCG/ACT nasal spray     LORATADINE-D 24HR  MG per 24 hr tablet     Turmeric 500 MG CAPS     Vaginal Lubricant (REVAREE) SUPP     VITAMIN D, CHOLECALCIFEROL, PO     Misc Natural Products (RELIZEN) TABS     Misc Natural Products (RISTELA) TABS     mupirocin (BACTROBAN) 2 % external ointment     No current facility-administered medications for this visit.      Past Medical History:   Patient Active Problem List   Diagnosis     Mild persistent asthma without complication     Vitamin D deficiency     Cervical high risk HPV (human papillomavirus) test positive     Pes planus of both feet     Chondromalacia of right patella     Advanced directives, counseling/discussion     Past Medical History:   Diagnosis Date     Cervical high risk HPV (human papillomavirus) test positive 11/05/2018    see problem list        CC Ember Eagle MD   DERMATOLOGY  [address] [facility] [address]  on close of this encounter.

## 2023-05-17 ENCOUNTER — OFFICE VISIT (OUTPATIENT)
Dept: DERMATOLOGY | Facility: CLINIC | Age: 58
End: 2023-05-17
Payer: COMMERCIAL

## 2023-05-17 DIAGNOSIS — I78.1 NON-NEOPLASTIC NEVUS: Primary | ICD-10-CM

## 2023-05-17 PROCEDURE — 99212 OFFICE O/P EST SF 10 MIN: CPT | Performed by: DERMATOLOGY

## 2023-05-17 ASSESSMENT — PAIN SCALES - GENERAL: PAINLEVEL: NO PAIN (0)

## 2023-05-17 NOTE — LETTER
5/17/2023       RE: Sarai Bob  [address] [place] [address]     Dear Colleague,    Thank you for referring your patient, Sarai Bob, to the [facility] DERMATOLOGY CLINIC [place] at [facility]. Please see a copy of my visit note below.    [facility] Dermatology Note  Encounter Date: May 17, 2023  Office Visit     Dermatology Problem List:  1. Benign biopsies   - Deep penetrating nevus, L upper cutaneous lip, s/p bx 2/15/23; re-excise if pigment recurrs  - BLK, upper paraspinal back, s/p bx 2/15/23  2. Dermatofibroma, R upper arm    ____________________________________________    Assessment & Plan:    # Deep penetrating nevus, L upper cutaneous lip, s/p bx 2/15/23  Today is well-healed without evidence of pigment recurrence. Per path report, they recommended re-excision if any pigment recurrence. Counseled that if she notices any pigment recurrence to take a photo and send via [employer] and we would refer her to Derm surgery team.  - Continue to monitor for recurrence       Procedures Performed:   None    Follow-up: 1 year(s) in-person, or earlier for new or changing lesions    Staff Medical Student and Scribe:     Scribe Disclosure:  I, Paul Lugo, am serving as a scribe to document services personally performed by Ember Eagle MD based on data collection and the provider's statements to me.    Seen and Staffed with Dr. Ember Long, MS4  [facility] [employer]    Staff Physician:  I was present with the medical student who participated in the service and in the documentation of the note. I have verified the history and personally performed the physical exam and medical decision making. I agree with the assessment and plan of care as documented in the note.         Ember Eagle MD    Department of Dermatology  [facility]  [facility]: Phone: [phone], Fax: [phone]  5/18/2023    ____________________________________________    CC: Derm Problem (3month follow up.  Has no knew areas of concern )    HPI:  Ms. Sarai Bob is a(n) 57 year old female who presents today as a return patient for follow-up on left upper cutaneous deep penetrating nevus . Last seen by me on 2/15/2023, at which time patient underwent biopsy x2 of NUBs on the left upper cutaneous lip which returned as deep penetrating nevus and on the upper paraspinal back which returned as BLK.    Today, she has no new concerns. Spot on the upper lip has healed up nicely, has had no changes in the last 3 months. Was able to take stitches out without any complications. No other lesions of concern today.    Patient is otherwise feeling well, without additional skin concerns.    Labs Reviewed:  N/A    Physical Exam:  Vitals: LMP 10/10/2020 (Approximate)   SKIN: Focused examination of face was performed.  - Well-healed scar in area of left upper cutaneous lip without recurrence of pigmentation.  - No other lesions of concern on areas examined.     Medications:  Current Outpatient Medications   Medication    ADVAIR HFA 45-21 MCG/ACT inhaler    albuterol (PROAIR HFA/PROVENTIL HFA/VENTOLIN HFA) 108 (90 Base) MCG/ACT inhaler    fluticasone (FLONASE) 50 MCG/ACT nasal spray    LORATADINE-D 24HR  MG per 24 hr tablet    Turmeric 500 MG CAPS    Vaginal Lubricant (REVAREE) SUPP    VITAMIN D, CHOLECALCIFEROL, PO    Misc Natural Products (RELIZEN) TABS    Misc Natural Products (RISTELA) TABS    mupirocin (BACTROBAN) 2 % external ointment     No current facility-administered medications for this visit.      Past Medical History:   Patient Active Problem List   Diagnosis    Mild persistent asthma without complication    Vitamin D deficiency    Cervical high risk HPV (human papillomavirus) test positive    Pes planus of both feet    Chondromalacia  of right patella    Advanced directives, counseling/discussion     Past Medical History:   Diagnosis Date    Cervical high risk HPV (human papillomavirus) test positive 11/05/2018    see problem list        CC Ember Eagle MD   DERMATOLOGY  [address] [place], [place] [address] [address] on close of this encounter.

## 2023-05-17 NOTE — NURSING NOTE
Dermatology Rooming Note    Sarai Bob's goals for this visit include:   Chief Complaint   Patient presents with     Derm Problem     3month follow up.  Has no knew areas of concern      Danika Mercado, Visit Facilitator

## 2023-05-17 NOTE — PATIENT INSTRUCTIONS
Thank you for coming in today!    If you see any recurrence of blue or black pigment in the spot we biopsied, please take a photo and send it to us via [employer]. We will refer you to the dermatology surgery team if pigment does show up again.

## 2023-06-07 NOTE — PROGRESS NOTES
{PROVIDER CHARTING PREFERENCE:347535}    Subjective   Sarai is a 57 year old, presenting for the following health issues:  Menopausal Sx         View : No data to display.              HPI     Menopause    {additonal problems for provider to add (Optional):062703}      Review of Systems   {ROS COMP (Optional):708987}      Objective    LMP 10/10/2020 (Approximate)   There is no height or weight on file to calculate BMI.  Physical Exam   {Exam List (Optional):546540}    {Diagnostic Test Results (Optional):843875}    {AMBULATORY ATTESTATION (Optional):151557}

## 2023-06-08 ENCOUNTER — OFFICE VISIT (OUTPATIENT)
Dept: OBGYN | Facility: CLINIC | Age: 58
End: 2023-06-08
Payer: COMMERCIAL

## 2023-06-08 VITALS
HEIGHT: 64 IN | DIASTOLIC BLOOD PRESSURE: 85 MMHG | WEIGHT: 158.4 LBS | OXYGEN SATURATION: 99 % | SYSTOLIC BLOOD PRESSURE: 134 MMHG | BODY MASS INDEX: 27.04 KG/M2 | HEART RATE: 82 BPM

## 2023-06-08 DIAGNOSIS — N95.8 GENITOURINARY SYNDROME OF MENOPAUSE: ICD-10-CM

## 2023-06-08 DIAGNOSIS — Z13.1 SCREENING FOR DIABETES MELLITUS: ICD-10-CM

## 2023-06-08 DIAGNOSIS — Z01.419 ENCOUNTER FOR ANNUAL ROUTINE GYNECOLOGICAL EXAMINATION: Primary | ICD-10-CM

## 2023-06-08 DIAGNOSIS — Z13.6 CARDIOVASCULAR SCREENING; LDL GOAL LESS THAN 130: ICD-10-CM

## 2023-06-08 PROCEDURE — 99386 PREV VISIT NEW AGE 40-64: CPT | Performed by: NURSE PRACTITIONER

## 2023-06-08 PROCEDURE — G0145 SCR C/V CYTO,THINLAYER,RESCR: HCPCS | Performed by: NURSE PRACTITIONER

## 2023-06-08 PROCEDURE — 87624 HPV HI-RISK TYP POOLED RSLT: CPT | Performed by: NURSE PRACTITIONER

## 2023-06-08 RX ORDER — ESTRADIOL 10 UG/1
INSERT VAGINAL
Qty: 40 TABLET | Refills: 3 | Status: SHIPPED | OUTPATIENT
Start: 2023-06-08 | End: 2023-07-10

## 2023-06-08 NOTE — PATIENT INSTRUCTIONS
If you have any questions regarding your visit, Please contact your care team.     [facility]: [phone]  To Schedule an Appointment 24/7  Call: [phone] TELEPHONE NUMBER     Sarah Garber- APRN CNP      Bridger Balderas-Surgery Scheduler  Mikaela-Surgery Scheduler         Monday 7:30 am-5:00 pm    Tuesday 8:00 am-4:00 pm    Wednesday 7:30 am-4:00 pm    Thursday 8:00 am-11:00 am    Friday 7:30 am-4:00 pm [address] [id number] [phone] ask for [facility]  [phone] Fax    Imaging Scheduling all locations  [phone]    [facility]  [address] [place] [address]   [place] [address] [phone]         Urgent Care locations:  [facility]   Monday-Friday  10 am - 8 pm  Saturday and Sunday   9 am - 5 pm     [phone]   If you need a medication refill, please contact your pharmacy. Please allow 3 business days for your refill to be completed.  As always, Thank you for trusting us with your healthcare needs!      see additional instructions from your care team below

## 2023-06-08 NOTE — PROGRESS NOTES
SUBJECTIVE:   CC: Sarai is an 57 year old who presents for preventive health visit.     Healthy Habits:     Getting at least 3 servings of Calcium per day:  Yes    Bi-annual eye exam:  Yes    Dental care twice a year:  Yes    Sleep apnea or symptoms of sleep apnea:  None    Diet:  Regular (no restrictions)    Frequency of exercise:  6-7 days/week    Duration of exercise:  30-45 minutes    Taking medications regularly:  Yes    Barriers to taking medications:  None    Medication side effects:  None    PHQ-2 Total Score: 0    Additional concerns today:  Yes (Menopause)    LMP 2/2020. Having symptoms of menopause-sleep changes, weight fluctuations, joint pains, hot flashes/night sweats, some urinary symptoms. Biggest concern is pain with intercourse. New  and she would like to be active with him and feel comfortable. Has tried several OTC treatments without much success. Does have dryness of the vaginal area as well.     Today's PHQ-2 Score:       6/7/2023    10:16 AM   PHQ-2 ( 1999 [employer])   Q1: Little interest or pleasure in doing things 0   Q2: Feeling down, depressed or hopeless 0   PHQ-2 Score 0   Q1: Little interest or pleasure in doing things Not at all   Q2: Feeling down, depressed or hopeless Not at all   PHQ-2 Score 0       Social History     Tobacco Use     Smoking status: Never     Smokeless tobacco: Never   Vaping Use     Vaping status: Never Used   Substance Use Topics     Alcohol use: Yes     Comment: OCC             5/9/2023     2:04 PM   Alcohol Use   Prescreen: >3 drinks/day or >7 drinks/week? No     Reviewed orders with patient.  Reviewed health maintenance and updated orders accordingly - Yes  Patient Active Problem List   Diagnosis     Mild persistent asthma without complication     Vitamin D deficiency     Cervical high risk HPV (human papillomavirus) test positive     Pes planus of both feet     Chondromalacia of right patella     Advanced directives, counseling/discussion     Past  Surgical History:   Procedure Laterality Date     NO HISTORY OF SURGERY         Social History     Tobacco Use     Smoking status: Never     Smokeless tobacco: Never   Vaping Use     Vaping status: Never Used   Substance Use Topics     Alcohol use: Yes     Comment: OCC     Family History   Problem Relation Age of Onset     Skin Cancer No family hx of            Breast Cancer Screening:  Any new diagnosis of family breast, ovarian, or bowel cancer? No    FHS-7:       7/22/2022     1:28 PM   Breast CA Risk Assessment (FHS-7)   Did any of your first-degree relatives have breast or ovarian cancer? No   Did any of your relatives have bilateral breast cancer? No   Did any man in your family have breast cancer? No   Did any woman in your family have breast and ovarian cancer? No   Did any woman in your family have breast cancer before age 50 y? No   Do you have 2 or more relatives with breast and/or ovarian cancer? No   Do you have 2 or more relatives with breast and/or bowel cancer? No       Mammogram Screening: Recommended mammography every 1-2 years with patient discussion and risk factor consideration  Pertinent mammograms are reviewed under the imaging tab.    History of abnormal Pap smear: YES - updated in Problem List and Health Maintenance accordingly      Latest Ref Rng & Units 2/21/2020     8:19 AM 2/21/2020     8:16 AM 11/5/2018     8:55 AM   PAP / HPV   PAP (Historical)   NIL      HPV 16 DNA NEG^Negative Negative    Negative     HPV 18 DNA NEG^Negative Negative    Negative     Other HR HPV NEG^Negative Negative    Positive       Reviewed and updated as needed this visit by clinical staff   Tobacco  Allergies  Meds   Med Hx  Surg Hx  Fam Hx  Soc Hx        Reviewed and updated as needed this visit by Provider                 Past Medical History:   Diagnosis Date     Cervical high risk HPV (human papillomavirus) test positive 11/05/2018    see problem list      Past Surgical History:   Procedure Laterality  "Date     NO HISTORY OF SURGERY         Review of Systems  CONSTITUTIONAL: NEGATIVE for fever, chills, change in weight  INTEGUMENTARY/SKIN: NEGATIVE for worrisome rashes, moles or lesions  EYES: NEGATIVE for vision changes or irritation  ENT: NEGATIVE for ear, mouth and throat problems  RESP: NEGATIVE for significant cough or SOB  BREAST: NEGATIVE for masses, tenderness or discharge  CV: NEGATIVE for chest pain, palpitations or peripheral edema  GI: NEGATIVE for nausea, abdominal pain, heartburn, or change in bowel habits   menopausal female: vaginal dryness and pain with intercourse   MUSCULOSKELETAL: NEGATIVE for significant arthralgias or myalgia  NEURO: NEGATIVE for weakness, dizziness or paresthesias  PSYCHIATRIC: NEGATIVE for changes in mood or affect      OBJECTIVE:   /85 (BP Location: Right arm, Patient Position: Sitting, Cuff Size: Adult Regular)   Pulse 82   Ht 1.626 m (5' 4\")   Wt 71.8 kg (158 lb 6.4 oz)   LMP 10/10/2020 (Approximate)   SpO2 99%   BMI 27.19 kg/m    Physical Exam  GENERAL APPEARANCE: healthy, alert and no distress  NECK: no adenopathy, no asymmetry, masses, or scars and thyroid normal to palpation  RESP: lungs clear to auscultation - no rales, rhonchi or wheezes  BREAST: normal without masses, tenderness or nipple discharge and no palpable axillary masses or adenopathy  CV: regular rate and rhythm, normal S1 S2, no S3 or S4, no murmur, click or rub, no peripheral edema and peripheral pulses strong  ABDOMEN: soft, nontender, no hepatosplenomegaly, no masses and bowel sounds normal   (female): normal female external genitalia, normal urethral meatus, vaginal mucosal atrophy noted, normal cervix, adnexae, and uterus without masses or abnormal discharge  MS: no musculoskeletal defects are noted and gait is age appropriate without ataxia  SKIN: no suspicious lesions or rashes  NEURO: Normal strength and tone, sensory exam grossly normal, mentation intact and speech " "normal  PSYCH: mentation appears normal and affect normal/bright    ASSESSMENT/PLAN:   (Z01.419) Encounter for annual routine gynecological examination  (primary encounter diagnosis)  Comment: Declines colon screening at this time, moving to [place] later this month and will establish care there.  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 Discussed vaginal vs systemic estrogen and use of both medications. Discussed recommendations for HRT primarily for control of vasomotor and urogenital symptoms. At this time, the vaginal symptoms are most bothersome. Discussed vaginal estrogen use, how the medication works. Patient will avoid intercourse for first 2 weeks of medication use and then will reduce medication to twice weekly.   Plan: estradiol (VAGIFEM) 10 MCG TABS vaginal tablet         COUNSELING:  Reviewed preventive health counseling, as reflected in patient instructions  Special attention given to:        Regular exercise       Healthy diet/nutrition       Colorectal Cancer Screening       (Nidhi)menopause management      BMI:   Estimated body mass index is 27.19 kg/m  as calculated from the following:    Height as of this encounter: 1.626 m (5' 4\").    Weight as of this encounter: 71.8 kg (158 lb 6.4 oz).   Weight management plan: Discussed healthy diet and exercise guidelines      She reports that she has never smoked. She has never used smokeless tobacco.          MARTINE Stevenson [facility]  "

## 2023-06-12 LAB
BKR LAB AP GYN ADEQUACY: NORMAL
BKR LAB AP GYN INTERPRETATION: NORMAL
BKR LAB AP HPV REFLEX: NORMAL
BKR LAB AP PREVIOUS ABNORMAL: NORMAL
PATH REPORT.COMMENTS IMP SPEC: NORMAL
PATH REPORT.COMMENTS IMP SPEC: NORMAL
PATH REPORT.RELEVANT HX SPEC: NORMAL

## 2023-06-14 ENCOUNTER — LAB (OUTPATIENT)
Dept: LAB | Facility: CLINIC | Age: 58
End: 2023-06-14
Payer: COMMERCIAL

## 2023-06-14 DIAGNOSIS — Z13.1 SCREENING FOR DIABETES MELLITUS: ICD-10-CM

## 2023-06-14 DIAGNOSIS — Z13.6 CARDIOVASCULAR SCREENING; LDL GOAL LESS THAN 130: ICD-10-CM

## 2023-06-14 LAB
CHOLEST SERPL-MCNC: 187 MG/DL
FASTING STATUS PATIENT QL REPORTED: YES
GLUCOSE SERPL-MCNC: 92 MG/DL (ref 70–99)
HDLC SERPL-MCNC: 83 MG/DL
HUMAN PAPILLOMA VIRUS 16 DNA: NEGATIVE
HUMAN PAPILLOMA VIRUS 18 DNA: NEGATIVE
HUMAN PAPILLOMA VIRUS FINAL DIAGNOSIS: NORMAL
HUMAN PAPILLOMA VIRUS OTHER HR: NEGATIVE
LDLC SERPL CALC-MCNC: 96 MG/DL
NONHDLC SERPL-MCNC: 104 MG/DL
TRIGL SERPL-MCNC: 40 MG/DL

## 2023-06-14 PROCEDURE — 80061 LIPID PANEL: CPT

## 2023-06-14 PROCEDURE — 82947 ASSAY GLUCOSE BLOOD QUANT: CPT

## 2023-06-14 PROCEDURE — 36415 COLL VENOUS BLD VENIPUNCTURE: CPT

## 2023-07-10 ENCOUNTER — TELEPHONE (OUTPATIENT)
Dept: OBGYN | Facility: CLINIC | Age: 58
End: 2023-07-10
Payer: COMMERCIAL

## 2023-07-10 DIAGNOSIS — N95.8 GENITOURINARY SYNDROME OF MENOPAUSE: ICD-10-CM

## 2023-07-10 RX ORDER — ESTRADIOL 10 UG/1
INSERT VAGINAL
Qty: 40 TABLET | Refills: 2 | Status: SHIPPED | OUTPATIENT
Start: 2023-07-10

## 2023-07-10 RX ORDER — ESTRADIOL 10 UG/1
INSERT VAGINAL
Qty: 40 TABLET | Refills: 2 | Status: CANCELLED | OUTPATIENT
Start: 2023-07-10

## 2023-07-10 NOTE — TELEPHONE ENCOUNTER
Patient calling [facility] requesting Vagifem be sent to [facility].   Pt reports she has moved to [place] and unable to pick-up from [facility] in [place].     Patient did not have phone number or address for [facility].     RN added the [facility] [place] [place]. Pt will call insurance and update us if pharmacy is different.     RN also gave patient [facility] phone number.     Sherrie Murray RN    [facility]

## 2023-07-10 NOTE — TELEPHONE ENCOUNTER
"[facility] Call Center    Phone Message    May a detailed message be left on voicemail: yes     Reason for Call: Other: Pt called indicating she needs a RX refill for \"Estradiol\", pt typically has it filled at [facility] in [place], but is currently in the [place] Area. Pt states she needs new RX Refill with [facility]. Pt [facility] ID# [id number].Please contact pt.      Action Taken: Message routed to:  [facility] p [phone]    Travel Screening: Not Applicable                                                                      "

## 2023-07-10 NOTE — TELEPHONE ENCOUNTER
Estradiol 10 mcg tabs was sent to [facility] on 7/10/23. See other TE from today, 7/10.    Suzette Fox RN

## 2023-10-16 ENCOUNTER — TELEPHONE (OUTPATIENT)
Dept: DERMATOLOGY | Facility: CLINIC | Age: 58
End: 2023-10-16
Payer: COMMERCIAL

## 2023-10-16 NOTE — TELEPHONE ENCOUNTER
Spoke with patient regarding scheduling a follow up with Dr. Eagle for Return in about 1 year (around 5/17/2024). Patient scheduled accordingly and is aware of time, date and location.

## 2024-06-17 PROBLEM — Z71.89 ADVANCED DIRECTIVES, COUNSELING/DISCUSSION: Status: RESOLVED | Noted: 2020-11-05 | Resolved: 2024-06-17

## 2024-06-28 ENCOUNTER — OFFICE VISIT (OUTPATIENT)
Dept: DERMATOLOGY | Facility: CLINIC | Age: 59
End: 2024-06-28
Payer: COMMERCIAL

## 2024-06-28 DIAGNOSIS — D18.01 CHERRY ANGIOMA: ICD-10-CM

## 2024-06-28 DIAGNOSIS — L81.4 LENTIGINES: ICD-10-CM

## 2024-06-28 DIAGNOSIS — D22.9 MULTIPLE BENIGN NEVI: Primary | ICD-10-CM

## 2024-06-28 DIAGNOSIS — L82.1 SEBORRHEIC KERATOSIS: ICD-10-CM

## 2024-06-28 PROCEDURE — 99213 OFFICE O/P EST LOW 20 MIN: CPT | Performed by: DERMATOLOGY

## 2024-06-28 ASSESSMENT — PAIN SCALES - GENERAL: PAINLEVEL: NO PAIN (0)

## 2024-06-28 NOTE — PROGRESS NOTES
[facility] Dermatology Note  Encounter Date: Jun 28, 2024  Office Visit     [facility] Problem List[facility]  [facility]     1. Benign biopsies   - Deep penetrating nevus, L upper cutaneous lip, s/p bx 2/15/23; re-excise if pigment recurrs  - BLK, upper paraspinal back, s/p bx 2/15/23  2. Dermatofibroma, R upper arm    #LTM L lateral calf, Favor trauma or scar cannot rule out BCC  - Photo 6/28/24    ____________________________________________    Assessment & Plan:    #LTM, L lateral calf  - L lateral calf three is a round pink macule with dermoscopy with hemorrhage and no other concern features. - Favor trauma or scar cannot rule out BCC   - Photographed today  - pt has moved to [place] so will establish care near there for derm, advised she should have this evaluated if does not resolve and we are happy to see her anytime     # Deep penetrating nevus, L upper cutaneous lip, s/p bx 2/15/23  Today is well-healed without evidence of pigment recurrence. Per path report, they recommended re-excision if any pigment recurrence. No pigment noted today. Counseled that if she notices any pigment recurrence to take a photo and send via [employer] and we would refer her to Derm surgery team.  - Continue to monitor for recurrence    # Multiple benign nevi.   - Monitor for ABCDEs of melanoma   - Continue sun protection - recommend SPF 30 or higher with frequent application   - Return sooner if noticing changing or symptomatic lesions    # Benign lesions - SKs, cherry angiomas, lentigenes.  - No treatment required        Procedures Performed:   None    Follow-up: prn as patient will establish care closer to [place]    Scribe Disclosure:  Scribe Disclosure:   I, LUKE WALKER, am serving as a scribe; to document services personally performed by Ember Eagle MD -based on data collection and the provider's statements to me.     Provider Disclosure:   The documentation recorded by the scribe accurately  reflects the services I personally performed and the decisions made by me.    Ember Eagle MD    Department of Dermatology  [facility]: Phone: [phone], Fax: [phone]  7/4/2024       ____________________________________________    CC: Skin Check (Patient reports no new lesions of concern. The patient would like a FBSE. )    HPI:  Ms. Sarai Bob is a(n) 58 year old female who presents today as a return patient for FBSC.     Today patient did not report any spots of concern.     Does not have a personal or family hx of skin cancer.     Labs Reviewed:  N/A    Physical Exam:  Vitals: LMP 10/10/2020 (Approximate)   SKIN: Total skin excluding the undergarment areas was performed. The exam included the head/face, neck, both arms, chest, back, abdomen, both legs, digits and/or nails.    - There are dome shaped bright red papules on the trunk and extremities .   - Multiple regular brown pigmented macules and papules are identified on the trunk and extremities. .   - Scattered brown macules on sun exposed areas.  - Waxy stuck on papules and plaques on trunk and extremities.    - Well healed scar on her L upper cutaneous lip without any pigment.   - L lateral calf three is a round pink macule with dermoscopy with hemorrhage and no other concern features. Favor trauma or scar cannot rule out BCC   - No other lesions of concern on areas examined.     Medications:  Current Outpatient Medications   Medication Sig Dispense Refill    ADVAIR HFA 45-21 MCG/ACT inhaler       albuterol (PROAIR HFA/PROVENTIL HFA/VENTOLIN HFA) 108 (90 Base) MCG/ACT inhaler Inhale 2 puffs into the lungs every 6 hours      estradiol (VAGIFEM) 10 MCG TABS vaginal tablet Place 1 tablet vaginally at bedtime for 2 weeks, then reduce to twice weekly 40 tablet 2    fluticasone (FLONASE) 50 MCG/ACT nasal spray Spray 1 spray into both nostrils daily       LORATADINE-D 24HR  MG per 24 hr tablet       VITAMIN D, CHOLECALCIFEROL, PO Take 3,000 Units by mouth daily      Misc Natural Products (RELIZEN) TABS       Misc Natural Products (RISTELA) TABS       mupirocin (BACTROBAN) 2 % external ointment Use 2 times a day to affected area. (Patient not taking: Reported on 5/17/2023) 30 g 1    Rhubarb (ESTROVEN MENOPAUSE RELIEF PO)       Turmeric 500 MG CAPS       Vaginal Lubricant (REVAREE) SUPP  (Patient not taking: Reported on 6/8/2023)       No current facility-administered medications for this visit.      Past Medical History:   Patient Active Problem List   Diagnosis    Mild persistent asthma without complication    Vitamin D deficiency    Cervical high risk HPV (human papillomavirus) test positive    Pes planus of both feet    Chondromalacia of right patella     Past Medical History:   Diagnosis Date    Cervical high risk HPV (human papillomavirus) test positive 11/05/2018    see problem list        CC Ember Eagle MD   DERMATOLOGY  [address] [place], [place] [id number] [address] on close of this encounter.

## 2024-06-28 NOTE — PATIENT INSTRUCTIONS
Patient Education       Proper skin care from [facility]:    -Eliminate harsh soaps as they strip the natural oils from the skin, often resulting in dry itchy skin ( i.e. Dial, Zest, Icelandic Spring)  -Use mild soaps such as Cetaphil or Dove Sensitive Skin in the shower. You do not need to use soap on arms, legs, and trunk every time you shower unless visibly soiled.   -Avoid hot or cold showers.  -After showering, lightly dry off and apply moisturizing within 2-3 minutes. This will help trap moisture in the skin.   -Aggressive use of a moisturizer at least 1-2 times a day to the entire body (including -Vanicream, Cetaphil, Aquaphor or Cerave) and moisturize hands after every washing.  -We recommend using moisturizers that come in a tub that needs to be scooped out, not a pump. This has more of an oil base. It will hold moisture in your skin much better than a water base moisturizer. The above recommended are non-pore clogging.      Wear a sunscreen with at least SPF 30 on your face, ears, neck and V of the chest daily. Wear sunscreen on other areas of the body if those areas are exposed to the sun throughout the day. Sunscreens can contain physical and/or chemical blockers. Physical blockers are less likely to clog pores, these include zinc oxide and titanium dioxide. Reapply every two hour and after swimming.     Sunscreen examples: https://www.ewg.org/sunscreen/    UV radiation  UVA radiation remains constant throughout the day and throughout the year. It is a longer wavelength than UVB and therefore penetrates deeper into the skin leading to immediate and delayed tanning, photoaging, and skin cancer. 70-80% of UVA and UVB radiation occurs between the hours of 10am-2pm.  UVB radiation  UVB radiation causes the most harmful effects and is more significant during the summer months. However, snow and ice can reflect UVB radiation leading to skin damage during the winter months as well. UVB radiation is  responsible for tanning, burning, inflammation, delayed erythema (pinkness), pigmentation (brown spots), and skin cancer.     I recommend self monthly full body exams and yearly full body exams with a dermatology provider. If you develop a new or changing lesion please follow up for examination. Most skin cancers are pink and scaly or pink and pearly. However, we do see blue/brown/black skin cancers.  Consider the ABCDEs of melanoma when giving yourself your monthly full body exam ( don't forget the groin, buttocks, feet, toes, etc). A-asymmetry, B-borders, C-color, D-diameter, E-elevation or evolving. If you see any of these changes please follow up in clinic. If you cannot see your back I recommend purchasing a hand held mirror to use with a larger wall mirror.       Checking for Skin Cancer  You can find cancer early by checking your skin each month. There are 3 kinds of skin cancer. They are melanoma, basal cell carcinoma, and squamous cell carcinoma. Doing monthly skin checks is the best way to find new marks or skin changes. Follow the instructions below for checking your skin.   The ABCDEs of checking moles for melanoma   Check your moles or growths for signs of melanoma using ABCDE:   Asymmetry: the sides of the mole or growth don t match  Border: the edges are ragged, notched, or blurred  Color: the color within the mole or growth varies  Diameter: the mole or growth is larger than 6 mm (size of a pencil eraser)  Evolving: the size, shape, or color of the mole or growth is changing (evolving is not shown in the images below)    Checking for other types of skin cancer  Basal cell carcinoma or squamous cell carcinoma have symptoms such as:     A spot or mole that looks different from all other marks on your skin  Changes in how an area feels, such as itching, tenderness, or pain  Changes in the skin's surface, such as oozing, bleeding, or scaliness  A sore that does not heal  New swelling or redness beyond  the border of a mole    Who s at risk?  Anyone can get skin cancer. But you are at greater risk if you have:   Fair skin, light-colored hair, or light-colored eyes  Many moles or abnormal moles on your skin  A history of sunburns from sunlight or tanning beds  A family history of skin cancer  A history of exposure to radiation or chemicals  A weakened immune system  If you have had skin cancer in the past, you are at risk for recurring skin cancer.   How to check your skin  Do your monthly skin checkups in front of a full-length mirror. Check all parts of your body, including your:   Head (ears, face, neck, and scalp)  Torso (front, back, and sides)  Arms (tops, undersides, upper, and lower armpits)  Hands (palms, backs, and fingers, including under the nails)  Buttocks and genitals  Legs (front, back, and sides)  Feet (tops, soles, toes, including under the nails, and between toes)  If you have a lot of moles, take digital photos of them each month. Make sure to take photos both up close and from a distance. These can help you see if any moles change over time.   Most skin changes are not cancer. But if you see any changes in your skin, call your doctor right away. Only he or she can diagnose a problem. If you have skin cancer, seeing your doctor can be the first step toward getting the treatment that could save your life.   [employer] last reviewed this educational content on 4/1/2019 2000-2020 [employer]. [address] [place] [address], [place] [address]. All rights reserved. This information is not intended as a substitute for professional medical care. Always follow your healthcare professional's instructions.

## 2024-06-28 NOTE — LETTER
6/28/2024       RE: Sarai Bob  [address] [place] [address]     Dear Colleague,    Thank you for referring your patient, Sarai Bob, to the [facility] [place] at [facility]. Please see a copy of my visit note below.    [facility] Dermatology Note  Encounter Date: Jun 28, 2024  Office Visit     Dermatology Problem List:  FBSC: 6/28/24     1. Benign biopsies   - Deep penetrating nevus, L upper cutaneous lip, s/p bx 2/15/23; re-excise if pigment recurrs  - BLK, upper paraspinal back, s/p bx 2/15/23  2. Dermatofibroma, R upper arm    #LTM L lateral calf, Favor trauma or scar cannot rule out BCC  - Photo 6/28/24    ____________________________________________    Assessment & Plan:    #LTM, L lateral calf  - L lateral calf three is a round pink macule with dermoscopy with hemorrhage and no other concern features. - Favor trauma or scar cannot rule out BCC   - Photographed today  - pt has moved to [place] so will establish care near there for derm, advised she should have this evaluated if does not resolve and we are happy to see her anytime     # Deep penetrating nevus, L upper cutaneous lip, s/p bx 2/15/23  Today is well-healed without evidence of pigment recurrence. Per path report, they recommended re-excision if any pigment recurrence. No pigment noted today. Counseled that if she notices any pigment recurrence to take a photo and [facility] and we would refer her to Derm surgery team.  - Continue to monitor for recurrence    # Multiple benign nevi.   - Monitor for ABCDEs of melanoma   - Continue sun protection - recommend SPF 30 or higher with frequent application   - Return sooner if noticing changing or symptomatic lesions    # Benign lesions - SKs, cherry angiomas, lentigenes.  - No treatment required        Procedures Performed:   None    Follow-up: prn as patient will establish care  closer to [place]    Scribe Disclosure:  Scribe Disclosure:   I, LUKE WALKER, am serving as a scribe; to document services personally performed by Ember Eagle MD -based on data collection and the provider's statements to me.     Provider Disclosure:   The documentation recorded by the scribe accurately reflects the services I personally performed and the decisions made by me.    Ember Eagle MD    Department of Dermatology  [facility]: Phone: [phone], Fax: [phone]  7/4/2024       ____________________________________________    CC: Skin Check (Patient reports no new lesions of concern. The patient would like a FBSE. )    HPI:  Ms. Sarai Bob is a(n) 58 year old female who presents today as a return patient for FBSC.     Today patient did not report any spots of concern.     Does not have a personal or family hx of skin cancer.     Labs Reviewed:  N/A    Physical Exam:  Vitals: LMP 10/10/2020 (Approximate)   SKIN: Total skin excluding the undergarment areas was performed. The exam included the head/face, neck, both arms, chest, back, abdomen, both legs, digits and/or nails.    - There are dome shaped bright red papules on the trunk and extremities .   - Multiple regular brown pigmented macules and papules are identified on the trunk and extremities. .   - Scattered brown macules on sun exposed areas.  - Waxy stuck on papules and plaques on trunk and extremities.    - Well healed scar on her L upper cutaneous lip without any pigment.   - L lateral calf three is a round pink macule with dermoscopy with hemorrhage and no other concern features. Favor trauma or scar cannot rule out BCC   - No other lesions of concern on areas examined.     Medications:  Current Outpatient Medications   Medication Sig Dispense Refill     ADVAIR HFA 45-21 MCG/ACT inhaler        albuterol (PROAIR HFA/PROVENTIL  HFA/VENTOLIN HFA) 108 (90 Base) MCG/ACT inhaler Inhale 2 puffs into the lungs every 6 hours       estradiol (VAGIFEM) 10 MCG TABS vaginal tablet Place 1 tablet vaginally at bedtime for 2 weeks, then reduce to twice weekly 40 tablet 2     fluticasone (FLONASE) 50 MCG/ACT nasal spray Spray 1 spray into both nostrils daily       LORATADINE-D 24HR  MG per 24 hr tablet        VITAMIN D, CHOLECALCIFEROL, PO Take 3,000 Units by mouth daily       Misc Natural Products (RELIZEN) TABS        Misc Natural Products (RISTELA) TABS        mupirocin (BACTROBAN) 2 % external ointment Use 2 times a day to affected area. (Patient not taking: Reported on 5/17/2023) 30 g 1     Rhubarb (ESTROVEN MENOPAUSE RELIEF PO)        Turmeric 500 MG CAPS        Vaginal Lubricant (REVAREE) SUPP  (Patient not taking: Reported on 6/8/2023)       No current facility-administered medications for this visit.      Past Medical History:   Patient Active Problem List   Diagnosis     Mild persistent asthma without complication     Vitamin D deficiency     Cervical high risk HPV (human papillomavirus) test positive     Pes planus of both feet     Chondromalacia of right patella     Past Medical History:   Diagnosis Date     Cervical high risk HPV (human papillomavirus) test positive 11/05/2018    see problem list        CC Ember Eagle MD   DERMATOLOGY  [address] [place], [place] [id number] [address] on close of this encounter.      Again, thank you for allowing me to participate in the care of your patient.      Sincerely,    Ember Eagle MD

## 2024-06-28 NOTE — NURSING NOTE
Chief Complaint   Patient presents with    Skin Check     Patient reports no new lesions of concern. The patient would like a FBSE.      Haven Hsu LPN

## 2025-02-23 NOTE — PROGRESS NOTES
PLEASE CALL PATIENT: Dear Sarai,      It was a pleasure to see you at your recent office visit.  Your test results are listed below. Xray doesn't show reason for your pain. Please see podiatry as discussed.         If you have any questions or concerns, please call the clinic at [phone].    Sincerely,  Divina Jay PA-C     St cath attempted unable to obtain urine at time.wet diaper changed .u bag applied.

## 2025-08-02 ENCOUNTER — HEALTH MAINTENANCE LETTER (OUTPATIENT)
Age: 60
End: 2025-08-02